# Patient Record
Sex: FEMALE | Race: BLACK OR AFRICAN AMERICAN | Employment: FULL TIME | URBAN - NONMETROPOLITAN AREA
[De-identification: names, ages, dates, MRNs, and addresses within clinical notes are randomized per-mention and may not be internally consistent; named-entity substitution may affect disease eponyms.]

---

## 2018-02-15 ENCOUNTER — HOSPITAL ENCOUNTER (OUTPATIENT)
Age: 33
Setting detail: SPECIMEN
Discharge: HOME | End: 2018-02-15
Payer: COMMERCIAL

## 2018-02-15 PROCEDURE — 88305 TISSUE EXAM BY PATHOLOGIST: CPT

## 2019-09-06 ENCOUNTER — HOSPITAL ENCOUNTER (EMERGENCY)
Age: 34
Discharge: HOME | End: 2019-09-06
Payer: COMMERCIAL

## 2019-09-06 VITALS
WEIGHT: 230 LBS | RESPIRATION RATE: 16 BRPM | DIASTOLIC BLOOD PRESSURE: 82 MMHG | HEART RATE: 74 BPM | SYSTOLIC BLOOD PRESSURE: 140 MMHG | OXYGEN SATURATION: 98 % | HEIGHT: 66 IN | BODY MASS INDEX: 36.96 KG/M2 | TEMPERATURE: 98.1 F

## 2019-09-06 DIAGNOSIS — J03.90 ACUTE TONSILLITIS, UNSPECIFIED ETIOLOGY: Primary | ICD-10-CM

## 2019-09-06 DIAGNOSIS — J02.9 ACUTE PHARYNGITIS, UNSPECIFIED ETIOLOGY: ICD-10-CM

## 2019-09-06 LAB
GROUP A STREP CULTURE, REFLEX: NEGATIVE
REFLEX THROAT C + S: NORMAL

## 2019-09-06 PROCEDURE — 99213 OFFICE O/P EST LOW 20 MIN: CPT | Performed by: NURSE PRACTITIONER

## 2019-09-06 PROCEDURE — 99213 OFFICE O/P EST LOW 20 MIN: CPT

## 2019-09-06 PROCEDURE — 87070 CULTURE OTHR SPECIMN AEROBIC: CPT

## 2019-09-06 RX ORDER — AMOXICILLIN 500 MG/1
500 CAPSULE ORAL 3 TIMES DAILY
Qty: 30 CAPSULE | Refills: 0 | Status: SHIPPED | OUTPATIENT
Start: 2019-09-09 | End: 2019-09-19

## 2019-09-06 SDOH — HEALTH STABILITY: MENTAL HEALTH: HOW OFTEN DO YOU HAVE A DRINK CONTAINING ALCOHOL?: NEVER

## 2019-09-06 ASSESSMENT — ENCOUNTER SYMPTOMS
SORE THROAT: 1
SINUS PRESSURE: 1
SINUS PAIN: 1
RHINORRHEA: 1

## 2019-09-06 NOTE — ED PROVIDER NOTES
Height as of this encounter: 5' 6\" (1.676 m). Weight as of this encounter: 230 lb (104.3 kg). ,Patient's last menstrual period was 08/12/2019. Physical Exam   Constitutional: She is oriented to person, place, and time. She appears well-developed and well-nourished. No distress. HENT:   Right Ear: Hearing, tympanic membrane, external ear and ear canal normal. Tympanic membrane is not injected, not scarred, not perforated, not erythematous, not retracted and not bulging. Tympanic membrane mobility is normal.   Left Ear: Tympanic membrane, external ear and ear canal normal. Tympanic membrane is not injected, not scarred, not perforated, not erythematous, not retracted and not bulging. Tympanic membrane mobility is normal.   Mouth/Throat: Oropharynx is clear and moist and mucous membranes are normal. Mucous membranes are not pale, not dry and not cyanotic. No oropharyngeal exudate, posterior oropharyngeal edema, posterior oropharyngeal erythema or tonsillar abscesses. Neck: Normal range of motion. Neck supple. Musculoskeletal: Normal range of motion. She exhibits no edema, tenderness or deformity. Neurological: She is alert and oriented to person, place, and time. No sensory deficit. Skin: Skin is warm and dry. Capillary refill takes less than 2 seconds. No rash noted. She is not diaphoretic. No erythema. Psychiatric: She has a normal mood and affect.  Her behavior is normal. Judgment and thought content normal.       DIAGNOSTIC RESULTS     Labs:  Results for orders placed or performed during the hospital encounter of 09/06/19   Strep A culture, throat   Result Value Ref Range    REFLEX THROAT C + S INDICATED    STREP A ANTIGEN   Result Value Ref Range    GROUP A STREP CULTURE, REFLEX NEGATIVE        IMAGING:    No orders to display     URGENT CARE COURSE:     Vitals:    09/06/19 1654   BP: (!) 140/82   Pulse: 74   Resp: 16   Temp: 98.1 °F (36.7 °C)   TempSrc: Temporal   SpO2: 98%   Weight: 230 lb (104.3 kg)   Height: 5' 6\" (1.676 m)       Medications - No data to display         PROCEDURES:  None    FINAL IMPRESSION      1. Acute tonsillitis, unspecified etiology    2. Acute pharyngitis, unspecified etiology          DISPOSITION/ PLAN   Patient is discharged home for amoxicillin, she may begin taking in the next 2 to 4 days if symptoms do not seem to be improving. Patient is made aware that her strep swab was negative, however will be sent for culture which can take up to 3 days. There is strong suspicion for viral infection given that patient's symptoms have been present for only 2 to 3 days, she is advised that most upper respiratory infections can take up to a full week to resolve.         PATIENT REFERRED TO:  LISSET Hanson - SHE  [facility] [address] [facility] [address] [facility] [address]      DISCHARGE MEDICATIONS:  Discharge Medication List as of 9/6/2019  5:22 PM      START taking these medications    Details   amoxicillin (AMOXIL) 500 MG capsule Take 1 capsule by mouth 3 times daily for 10 days, Disp-30 capsule, R-0Print             Discharge Medication List as of 9/6/2019  5:22 PM          Discharge Medication List as of 9/6/2019  5:22 PM          LISSET Thomas NP    (Please note that portions of this note were completed with a voice recognition program. Efforts were made to edit the dictations but occasionally words are mis-transcribed.)          LISSET Herrera NP  09/06/19 2862

## 2019-09-08 LAB — THROAT/NOSE CULTURE: NORMAL

## 2019-10-17 ENCOUNTER — HOSPITAL ENCOUNTER (EMERGENCY)
Age: 34
Discharge: HOME | End: 2019-10-17
Payer: COMMERCIAL

## 2019-10-17 VITALS
WEIGHT: 223 LBS | HEIGHT: 66 IN | SYSTOLIC BLOOD PRESSURE: 131 MMHG | HEART RATE: 90 BPM | OXYGEN SATURATION: 96 % | TEMPERATURE: 97 F | RESPIRATION RATE: 16 BRPM | DIASTOLIC BLOOD PRESSURE: 73 MMHG | BODY MASS INDEX: 35.84 KG/M2

## 2019-10-17 DIAGNOSIS — A08.4 VIRAL GASTROENTERITIS: Primary | ICD-10-CM

## 2019-10-17 DIAGNOSIS — G43.009 MIGRAINE WITHOUT AURA AND WITHOUT STATUS MIGRAINOSUS, NOT INTRACTABLE: ICD-10-CM

## 2019-10-17 PROCEDURE — 99213 OFFICE O/P EST LOW 20 MIN: CPT

## 2019-10-17 PROCEDURE — 99213 OFFICE O/P EST LOW 20 MIN: CPT | Performed by: NURSE PRACTITIONER

## 2019-10-17 RX ORDER — ONDANSETRON 4 MG/1
4 TABLET, ORALLY DISINTEGRATING ORAL EVERY 8 HOURS PRN
Qty: 12 TABLET | Refills: 0 | Status: SHIPPED | OUTPATIENT
Start: 2019-10-17 | End: 2019-11-07

## 2019-10-17 ASSESSMENT — ENCOUNTER SYMPTOMS
TROUBLE SWALLOWING: 0
DIARRHEA: 1
ABDOMINAL PAIN: 0
VOMITING: 0
PHOTOPHOBIA: 1
COUGH: 0
RHINORRHEA: 0
SORE THROAT: 0
ABDOMINAL DISTENTION: 0
SHORTNESS OF BREATH: 0
NAUSEA: 1

## 2019-11-07 ENCOUNTER — HOSPITAL ENCOUNTER (EMERGENCY)
Age: 34
Discharge: HOME | End: 2019-11-07
Payer: COMMERCIAL

## 2019-11-07 VITALS
TEMPERATURE: 97.7 F | WEIGHT: 223 LBS | BODY MASS INDEX: 35.99 KG/M2 | OXYGEN SATURATION: 97 % | RESPIRATION RATE: 16 BRPM | DIASTOLIC BLOOD PRESSURE: 70 MMHG | HEART RATE: 71 BPM | SYSTOLIC BLOOD PRESSURE: 119 MMHG

## 2019-11-07 DIAGNOSIS — J06.9 UPPER RESPIRATORY TRACT INFECTION, UNSPECIFIED TYPE: Primary | ICD-10-CM

## 2019-11-07 PROCEDURE — 99213 OFFICE O/P EST LOW 20 MIN: CPT | Performed by: NURSE PRACTITIONER

## 2019-11-07 PROCEDURE — 99212 OFFICE O/P EST SF 10 MIN: CPT

## 2019-11-07 RX ORDER — AMOXICILLIN AND CLAVULANATE POTASSIUM 875; 125 MG/1; MG/1
1 TABLET, FILM COATED ORAL 2 TIMES DAILY WITH MEALS
Qty: 20 TABLET | Refills: 0 | Status: SHIPPED | OUTPATIENT
Start: 2019-11-07 | End: 2019-11-17

## 2019-11-07 RX ORDER — GUAIFENESIN AND DEXTROMETHORPHAN HYDROBROMIDE 600; 30 MG/1; MG/1
1 TABLET, EXTENDED RELEASE ORAL 3 TIMES DAILY PRN
Qty: 30 TABLET | Refills: 0 | Status: SHIPPED | OUTPATIENT
Start: 2019-11-07 | End: 2019-11-17

## 2019-11-07 ASSESSMENT — ENCOUNTER SYMPTOMS
SORE THROAT: 1
NAUSEA: 0
DIARRHEA: 0
ABDOMINAL PAIN: 0
CHEST TIGHTNESS: 0
VOMITING: 0
SINUS PRESSURE: 1
COUGH: 1

## 2019-11-09 ENCOUNTER — HOSPITAL ENCOUNTER (EMERGENCY)
Age: 34
Discharge: HOME | End: 2019-11-09
Payer: COMMERCIAL

## 2019-11-09 VITALS
RESPIRATION RATE: 18 BRPM | OXYGEN SATURATION: 97 % | BODY MASS INDEX: 35.84 KG/M2 | HEIGHT: 66 IN | HEART RATE: 90 BPM | WEIGHT: 223 LBS | TEMPERATURE: 97.6 F | DIASTOLIC BLOOD PRESSURE: 77 MMHG | SYSTOLIC BLOOD PRESSURE: 138 MMHG

## 2019-11-09 DIAGNOSIS — J06.9 ACUTE UPPER RESPIRATORY INFECTION: Primary | ICD-10-CM

## 2019-11-09 PROCEDURE — 99214 OFFICE O/P EST MOD 30 MIN: CPT | Performed by: NURSE PRACTITIONER

## 2019-11-09 PROCEDURE — 99214 OFFICE O/P EST MOD 30 MIN: CPT

## 2019-11-09 RX ORDER — PREDNISONE 20 MG/1
20 TABLET ORAL 2 TIMES DAILY
Qty: 10 TABLET | Refills: 0 | Status: SHIPPED | OUTPATIENT
Start: 2019-11-09 | End: 2019-11-14

## 2019-11-09 RX ORDER — FLUTICASONE PROPIONATE 50 MCG
1 SPRAY, SUSPENSION (ML) NASAL DAILY
Qty: 1 BOTTLE | Refills: 0 | Status: SHIPPED | OUTPATIENT
Start: 2019-11-09 | End: 2019-12-27

## 2019-11-09 ASSESSMENT — ENCOUNTER SYMPTOMS
DIARRHEA: 1
SHORTNESS OF BREATH: 0
VOMITING: 0
SINUS PRESSURE: 1
EYE ITCHING: 0
COUGH: 1
RHINORRHEA: 1
NAUSEA: 0
EYE DISCHARGE: 0
SORE THROAT: 1
SINUS PAIN: 0

## 2019-12-27 ENCOUNTER — HOSPITAL ENCOUNTER (EMERGENCY)
Age: 34
Discharge: HOME | End: 2019-12-27
Payer: COMMERCIAL

## 2019-12-27 VITALS
WEIGHT: 230 LBS | RESPIRATION RATE: 16 BRPM | HEIGHT: 66 IN | SYSTOLIC BLOOD PRESSURE: 101 MMHG | BODY MASS INDEX: 36.96 KG/M2 | DIASTOLIC BLOOD PRESSURE: 69 MMHG | TEMPERATURE: 98.6 F | HEART RATE: 77 BPM | OXYGEN SATURATION: 96 %

## 2019-12-27 DIAGNOSIS — J06.9 VIRAL URI WITH COUGH: ICD-10-CM

## 2019-12-27 DIAGNOSIS — H65.01 NON-RECURRENT ACUTE SEROUS OTITIS MEDIA OF RIGHT EAR: Primary | ICD-10-CM

## 2019-12-27 PROCEDURE — 99213 OFFICE O/P EST LOW 20 MIN: CPT | Performed by: NURSE PRACTITIONER

## 2019-12-27 PROCEDURE — 99212 OFFICE O/P EST SF 10 MIN: CPT

## 2019-12-27 RX ORDER — HYDROXYZINE PAMOATE 50 MG/1
50 CAPSULE ORAL 3 TIMES DAILY PRN
COMMUNITY
End: 2021-09-01

## 2019-12-27 RX ORDER — TOPIRAMATE 50 MG/1
50 TABLET, FILM COATED ORAL 2 TIMES DAILY
COMMUNITY

## 2019-12-27 RX ORDER — VENLAFAXINE HYDROCHLORIDE 150 MG/1
150 CAPSULE, EXTENDED RELEASE ORAL DAILY
COMMUNITY

## 2019-12-27 RX ORDER — AMITRIPTYLINE HYDROCHLORIDE 25 MG/1
25 TABLET, FILM COATED ORAL NIGHTLY
COMMUNITY

## 2019-12-27 RX ORDER — AMOXICILLIN 875 MG/1
875 TABLET, COATED ORAL 2 TIMES DAILY
Qty: 20 TABLET | Refills: 0 | Status: SHIPPED | OUTPATIENT
Start: 2019-12-27 | End: 2020-01-06

## 2019-12-27 ASSESSMENT — ENCOUNTER SYMPTOMS
NAUSEA: 0
BACK PAIN: 0
COUGH: 1
VOMITING: 0
DIARRHEA: 0
RHINORRHEA: 0
SHORTNESS OF BREATH: 0
TROUBLE SWALLOWING: 0
SINUS PRESSURE: 0
SORE THROAT: 0

## 2019-12-27 ASSESSMENT — PAIN SCALES - GENERAL: PAINLEVEL_OUTOF10: 8

## 2019-12-27 ASSESSMENT — PAIN DESCRIPTION - LOCATION: LOCATION: EAR

## 2019-12-27 ASSESSMENT — PAIN DESCRIPTION - ORIENTATION: ORIENTATION: RIGHT

## 2020-01-04 ENCOUNTER — HOSPITAL ENCOUNTER (EMERGENCY)
Age: 35
Discharge: HOME | End: 2020-01-04
Attending: EMERGENCY MEDICINE
Payer: COMMERCIAL

## 2020-01-04 VITALS
HEIGHT: 66 IN | BODY MASS INDEX: 38.57 KG/M2 | OXYGEN SATURATION: 93 % | TEMPERATURE: 97.4 F | RESPIRATION RATE: 16 BRPM | SYSTOLIC BLOOD PRESSURE: 110 MMHG | HEART RATE: 81 BPM | DIASTOLIC BLOOD PRESSURE: 70 MMHG | WEIGHT: 240 LBS

## 2020-01-04 PROCEDURE — 6370000000 HC RX 637 (ALT 250 FOR IP): Performed by: EMERGENCY MEDICINE

## 2020-01-04 PROCEDURE — 99214 OFFICE O/P EST MOD 30 MIN: CPT | Performed by: EMERGENCY MEDICINE

## 2020-01-04 PROCEDURE — 99213 OFFICE O/P EST LOW 20 MIN: CPT

## 2020-01-04 RX ORDER — ONDANSETRON 4 MG/1
4 TABLET, ORALLY DISINTEGRATING ORAL 4 TIMES DAILY PRN
Qty: 12 TABLET | Refills: 0 | Status: SHIPPED | OUTPATIENT
Start: 2020-01-04 | End: 2020-05-16

## 2020-01-04 RX ORDER — ONDANSETRON 4 MG/1
8 TABLET, ORALLY DISINTEGRATING ORAL ONCE
Status: COMPLETED | OUTPATIENT
Start: 2020-01-04 | End: 2020-01-04

## 2020-01-04 RX ADMIN — ONDANSETRON 8 MG: 4 TABLET, ORALLY DISINTEGRATING ORAL at 09:16

## 2020-01-04 ASSESSMENT — ENCOUNTER SYMPTOMS
COUGH: 0
TROUBLE SWALLOWING: 0
SINUS PRESSURE: 0
VOMITING: 0
DIARRHEA: 1
ABDOMINAL PAIN: 1
BACK PAIN: 0
WHEEZING: 0
BLOOD IN STOOL: 0
SHORTNESS OF BREATH: 0
EYE PAIN: 0
EYE REDNESS: 0
VOICE CHANGE: 0
SORE THROAT: 0
EYE DISCHARGE: 0
CHOKING: 0
CONSTIPATION: 0
FACIAL SWELLING: 0
NAUSEA: 1
STRIDOR: 0

## 2020-01-04 ASSESSMENT — PAIN SCALES - GENERAL: PAINLEVEL_OUTOF10: 8

## 2020-01-04 ASSESSMENT — PAIN DESCRIPTION - PAIN TYPE: TYPE: ACUTE PAIN

## 2020-01-04 ASSESSMENT — PAIN DESCRIPTION - DESCRIPTORS: DESCRIPTORS: ACHING

## 2020-01-04 ASSESSMENT — PAIN DESCRIPTION - LOCATION: LOCATION: ABDOMEN

## 2020-01-04 NOTE — ED PROVIDER NOTES
tenderness. Left Sinus: No maxillary sinus tenderness or frontal sinus tenderness. Mouth/Throat:      Pharynx: No oropharyngeal exudate. Comments: Oropharynx normal.  TMs normal  Eyes:      General: No scleral icterus. Right eye: No discharge. Left eye: No discharge. Conjunctiva/sclera: Conjunctivae normal.      Pupils: Pupils are equal, round, and reactive to light. Comments: Conjunctiva clear   Neck:      Musculoskeletal: Normal range of motion. Thyroid: No thyromegaly. Vascular: No JVD. Comments: No meningismus  Cardiovascular:      Rate and Rhythm: Normal rate and regular rhythm. Pulses: Normal pulses. Heart sounds: Normal heart sounds, S1 normal and S2 normal. No murmur. No friction rub. No gallop. Pulmonary:      Effort: Pulmonary effort is normal. No respiratory distress. Breath sounds: Normal breath sounds. No stridor. No wheezing or rales. Comments: No cough, lungs clear  Chest:      Chest wall: No tenderness. Abdominal:      General: Bowel sounds are normal. There is no distension. Palpations: Abdomen is soft. There is no mass. Tenderness: There is tenderness in the epigastric area and periumbilical area. There is no right CVA tenderness, left CVA tenderness, guarding or rebound. Comments:  bowel sounds present, no right lower quadrant tenderness   Musculoskeletal: Normal range of motion. General: No tenderness. Right lower leg: Normal.      Left lower leg: Normal.      Comments: Normal joints   Lymphadenopathy:      Cervical: Cervical adenopathy present. Right cervical: Superficial cervical adenopathy present. No deep or posterior cervical adenopathy. Left cervical: Superficial cervical adenopathy present. No deep or posterior cervical adenopathy. Skin:     General: Skin is warm and dry. Findings: No erythema or rash.       Comments: No Rash or bruising   Neurological:      Mental

## 2020-01-04 NOTE — LETTER
[address] [facility]  [address] [facility] [address]  Phone: [phone]               January 4, 2020    Patient: Kate Henry   YOB: 1985   Date of Visit: 1/4/2020       To Whom It May Concern:    Shawn Menendez was seen and treated in our emergency department on 1/4/2020. She may return to work on 1/6/20. No work January 4 and January 5, 2020.       Sincerely,       Larry Hwang MD         Signature:__________________________________

## 2020-03-02 ENCOUNTER — HOSPITAL ENCOUNTER (EMERGENCY)
Age: 35
Discharge: HOME | End: 2020-03-02
Payer: COMMERCIAL

## 2020-03-02 VITALS
OXYGEN SATURATION: 97 % | SYSTOLIC BLOOD PRESSURE: 116 MMHG | DIASTOLIC BLOOD PRESSURE: 62 MMHG | HEART RATE: 82 BPM | WEIGHT: 224 LBS | BODY MASS INDEX: 36.15 KG/M2 | RESPIRATION RATE: 16 BRPM | TEMPERATURE: 98.1 F

## 2020-03-02 PROCEDURE — 99214 OFFICE O/P EST MOD 30 MIN: CPT | Performed by: NURSE PRACTITIONER

## 2020-03-02 PROCEDURE — 99212 OFFICE O/P EST SF 10 MIN: CPT

## 2020-03-02 RX ORDER — CEFDINIR 300 MG/1
300 CAPSULE ORAL 2 TIMES DAILY
Qty: 20 CAPSULE | Refills: 0 | Status: SHIPPED | OUTPATIENT
Start: 2020-03-02 | End: 2020-03-12

## 2020-03-02 ASSESSMENT — ENCOUNTER SYMPTOMS
COUGH: 1
SORE THROAT: 0
DIARRHEA: 0
CHEST TIGHTNESS: 0
SINUS PRESSURE: 1
EYE ITCHING: 0
ABDOMINAL PAIN: 0
RHINORRHEA: 0
VOMITING: 0
EYE REDNESS: 0
SHORTNESS OF BREATH: 0
SINUS PAIN: 1
NAUSEA: 0

## 2020-03-02 NOTE — ED PROVIDER NOTES
[address]       Chief Complaint   Patient presents with    Otalgia     Pt has been having pain in both ears. Pt also having trouble with vertigo. Nurses Notes reviewed and I agree except as noted in the HPI. HISTORY OF PRESENT ILLNESS   Judy Ramirez is a 37 y.o. female who presents for evaluation of ear pain and fullness for the last 3 days. No medications taken for symptoms. REVIEW OF SYSTEMS     Review of Systems   Constitutional: Negative for chills and fever. HENT: Positive for congestion, ear pain (some dizziness), sinus pressure and sinus pain. Negative for postnasal drip, rhinorrhea and sore throat. Eyes: Negative for redness and itching. Respiratory: Positive for cough. Negative for chest tightness and shortness of breath. Cardiovascular: Negative for chest pain. Gastrointestinal: Negative for abdominal pain, diarrhea, nausea and vomiting. Musculoskeletal: Negative for joint swelling and myalgias. Skin: Negative for rash. Allergic/Immunologic: Negative for environmental allergies and food allergies. Neurological: Negative for headaches. PAST MEDICAL HISTORY         Diagnosis Date    Anxiety     Depression     Migraines        SURGICAL HISTORY     Patient  has a past surgical history that includes Tubal ligation.     CURRENT MEDICATIONS       Discharge Medication List as of 3/2/2020  3:50 PM      CONTINUE these medications which have NOT CHANGED    Details   ondansetron (ZOFRAN ODT) 4 MG disintegrating tablet Take 1 tablet by mouth 4 times daily as needed for Nausea or Vomiting (Dissolve on tongue 4 times daily for nausea or vomiting), Disp-12 tablet, R-0Print      amitriptyline (ELAVIL) 25 MG tablet Take 25 mg by mouth nightlyHistorical Med      venlafaxine (EFFEXOR XR) 150 MG extended release capsule Take 150 mg by mouth dailyHistorical Med      hydrOXYzine (VISTARIL) 50 MG capsule Take 50 mg by mouth 3 times daily as needed for ItchingHistorical Med      Pantoprazole Sodium (PROTONIX PO) Take by mouthHistorical Med      topiramate (TOPAMAX) 50 MG tablet Take 50 mg by mouth 2 times dailyHistorical Med             ALLERGIES     Patient is is allergic to latex. FAMILY HISTORY     Patient'sfamily history includes Diabetes in her mother; High Blood Pressure in her father and mother; Other in her mother. SOCIAL HISTORY     Patient  reports that she has never smoked. She has never used smokeless tobacco. She reports that she does not drink alcohol or use drugs. PHYSICAL EXAM     ED TRIAGE VITALS  BP: 116/62, Temp: 98.1 °F (36.7 °C), Pulse: 82, Resp: 16, SpO2: 97 %  Physical Exam  Vitals signs and nursing note reviewed. Constitutional:       General: She is not in acute distress. Appearance: Normal appearance. She is well-developed. HENT:      Head: Normocephalic and atraumatic. Right Ear: Ear canal and external ear normal. No middle ear effusion. Left Ear: Ear canal and external ear normal. A middle ear effusion (cloudy) is present. Nose: Nose normal.      Mouth/Throat:      Lips: Pink. Mouth: Mucous membranes are moist.      Pharynx: Oropharynx is clear. Eyes:      Conjunctiva/sclera: Conjunctivae normal.      Right eye: Right conjunctiva is not injected. Left eye: Left conjunctiva is not injected. Pupils: Pupils are equal.   Neck:      Musculoskeletal: Normal range of motion. Cardiovascular:      Rate and Rhythm: Normal rate. Heart sounds: Normal heart sounds. Pulmonary:      Effort: Pulmonary effort is normal.      Breath sounds: Normal breath sounds. Lymphadenopathy:      Cervical: No cervical adenopathy. Skin:     General: Skin is warm and dry. Findings: No rash (on exposed surfaces). Neurological:      Mental Status: She is alert and oriented to person, place, and time.    Psychiatric:         Speech: Speech normal.         Behavior:

## 2020-03-15 ENCOUNTER — HOSPITAL ENCOUNTER (EMERGENCY)
Age: 35
Discharge: HOME | End: 2020-03-15
Payer: COMMERCIAL

## 2020-03-15 VITALS
RESPIRATION RATE: 16 BRPM | BODY MASS INDEX: 36.15 KG/M2 | WEIGHT: 224 LBS | SYSTOLIC BLOOD PRESSURE: 126 MMHG | DIASTOLIC BLOOD PRESSURE: 62 MMHG | HEART RATE: 76 BPM | OXYGEN SATURATION: 98 % | TEMPERATURE: 98.6 F

## 2020-03-15 PROCEDURE — 99212 OFFICE O/P EST SF 10 MIN: CPT

## 2020-03-15 PROCEDURE — 99213 OFFICE O/P EST LOW 20 MIN: CPT | Performed by: NURSE PRACTITIONER

## 2020-03-15 RX ORDER — AMOXICILLIN 500 MG/1
500 CAPSULE ORAL 2 TIMES DAILY
Qty: 20 CAPSULE | Refills: 0 | Status: SHIPPED | OUTPATIENT
Start: 2020-03-15 | End: 2020-03-25

## 2020-03-15 ASSESSMENT — ENCOUNTER SYMPTOMS
COUGH: 1
EYE PAIN: 0
EYE REDNESS: 0
EYE ITCHING: 0
SINUS PRESSURE: 0
SHORTNESS OF BREATH: 0
NAUSEA: 0
CONSTIPATION: 0
CHEST TIGHTNESS: 0
DIARRHEA: 0
BACK PAIN: 0
EYE DISCHARGE: 0
WHEEZING: 0
VOMITING: 0
RHINORRHEA: 1
TROUBLE SWALLOWING: 0
SORE THROAT: 1
ABDOMINAL PAIN: 0

## 2020-03-15 NOTE — ED PROVIDER NOTES
[address]       Chief Complaint   Patient presents with    Otalgia     r ear       Nurses Notes reviewed and I agree except as noted in the HPI. HISTORY OF PRESENT ILLNESS   Nicole Reyes is a 37 y.o. female who presents urgent care for complaints of right otalgia. The history is provided by the patient. Ear Problem   Location:  Right  Quality:  Aching and pressure  Onset quality:  Sudden  Timing:  Constant  Progression:  Unchanged  Chronicity:  New  Context: recent URI    Relieved by:  Nothing  Worsened by:  Coughing and swallowing  Ineffective treatments:  None tried  Associated symptoms: congestion, cough, headaches, rhinorrhea and sore throat    Associated symptoms: no abdominal pain, no diarrhea, no ear discharge, no fever, no hearing loss, no tinnitus and no vomiting        REVIEW OF SYSTEMS     Review of Systems   Constitutional: Negative for activity change, appetite change, chills, fatigue and fever. HENT: Positive for congestion, postnasal drip, rhinorrhea and sore throat. Negative for ear discharge, ear pain, hearing loss, sinus pressure, tinnitus and trouble swallowing. Eyes: Negative for pain, discharge, redness and itching. Respiratory: Positive for cough. Negative for chest tightness, shortness of breath and wheezing. Cardiovascular: Negative for chest pain, palpitations and leg swelling. Gastrointestinal: Negative for abdominal pain, constipation, diarrhea, nausea and vomiting. Endocrine: Negative. Genitourinary: Negative for dysuria, flank pain, frequency and hematuria. Musculoskeletal: Negative for arthralgias, back pain, joint swelling and myalgias. Skin: Negative. Neurological: Positive for headaches. Negative for dizziness and light-headedness. Hematological: Negative.         PAST MEDICAL HISTORY         Diagnosis Date    Anxiety     Depression     Migraines        SURGICAL HISTORY range of motion and neck supple. Thyroid: No thyromegaly. Vascular: No JVD. Trachea: No tracheal deviation. Cardiovascular:      Rate and Rhythm: Normal rate and regular rhythm. Heart sounds: Normal heart sounds. No murmur. No friction rub. No gallop. Pulmonary:      Effort: Pulmonary effort is normal. No respiratory distress. Breath sounds: Normal breath sounds. No stridor. No wheezing or rales. Chest:      Chest wall: No tenderness. Lymphadenopathy:      Cervical: No cervical adenopathy. Skin:     General: Skin is warm and dry. Neurological:      Mental Status: She is alert and oriented to person, place, and time. Psychiatric:         Behavior: Behavior normal.         Judgment: Judgment normal.         DIAGNOSTIC RESULTS   Labs: No results found for this visit on 03/15/20. IMAGING:  No orders to display     URGENT CARE COURSE:     Vitals:    03/15/20 1236 03/15/20 1335   BP: (!) 125/56 126/62   Pulse: 78 76   Resp: 16 16   Temp: 98.6 °F (37 °C)    TempSrc: Temporal    SpO2: 98% 98%   Weight: 224 lb (101.6 kg)        Medications - No data to display  PROCEDURES:  None  FINALIMPRESSION      1. Non-recurrent acute serous otitis media of right ear        DISPOSITION/PLAN   DISPOSITION Decision To Discharge 03/15/2020 01:31:10 PM  Patient given educational materials - see patient instructions. Discussed use, benefit, and side effects of prescribed medications. All patient questions answered. Pt voiced understanding. Reviewed health maintenance. Patient agreed with treatment plan.  Follow up as directed.        PATIENT REFERRED TO:  LISSET Pope - CNP  [address] [place] [address]  [phone]    In 2 days  If symptoms worsen    DISCHARGE MEDICATIONS:  Discharge Medication List as of 3/15/2020  1:32 PM      START taking these medications    Details   amoxicillin (AMOXIL) 500 MG capsule Take 1 capsule by mouth 2 times daily for 10 days, Disp-20

## 2020-03-29 ENCOUNTER — HOSPITAL ENCOUNTER (EMERGENCY)
Age: 35
Discharge: HOME | End: 2020-03-29
Payer: COMMERCIAL

## 2020-03-29 VITALS
RESPIRATION RATE: 18 BRPM | HEART RATE: 96 BPM | OXYGEN SATURATION: 96 % | DIASTOLIC BLOOD PRESSURE: 70 MMHG | SYSTOLIC BLOOD PRESSURE: 116 MMHG | TEMPERATURE: 98.4 F

## 2020-03-29 PROCEDURE — 99213 OFFICE O/P EST LOW 20 MIN: CPT | Performed by: NURSE PRACTITIONER

## 2020-03-29 PROCEDURE — 99212 OFFICE O/P EST SF 10 MIN: CPT

## 2020-03-29 RX ORDER — OFLOXACIN 3 MG/ML
10 SOLUTION AURICULAR (OTIC) DAILY
Qty: 1 BOTTLE | Refills: 0 | Status: SHIPPED | OUTPATIENT
Start: 2020-03-29 | End: 2020-04-05

## 2020-03-29 ASSESSMENT — ENCOUNTER SYMPTOMS
DIARRHEA: 0
SINUS PAIN: 0
TROUBLE SWALLOWING: 0
COUGH: 0
FACIAL SWELLING: 0
SORE THROAT: 0
EYE DISCHARGE: 0
EYE REDNESS: 0
SINUS PRESSURE: 0
NAUSEA: 0
VOMITING: 0
SHORTNESS OF BREATH: 0
RHINORRHEA: 1

## 2020-03-29 ASSESSMENT — PAIN DESCRIPTION - ORIENTATION: ORIENTATION: RIGHT

## 2020-03-29 ASSESSMENT — PAIN SCALES - GENERAL: PAINLEVEL_OUTOF10: 8

## 2020-03-29 ASSESSMENT — PAIN DESCRIPTION - FREQUENCY: FREQUENCY: CONTINUOUS

## 2020-03-29 ASSESSMENT — PAIN DESCRIPTION - PAIN TYPE: TYPE: ACUTE PAIN

## 2020-03-29 ASSESSMENT — PAIN DESCRIPTION - DESCRIPTORS: DESCRIPTORS: CONSTANT

## 2020-03-29 ASSESSMENT — PAIN DESCRIPTION - LOCATION: LOCATION: EAR

## 2020-03-29 NOTE — ED PROVIDER NOTES
[address]       Chief Complaint   Patient presents with    Otalgia     right ear pain onset 1.5 months ago       Nurses Notes reviewed and I agree except as noted in the HPI. HISTORY OF PRESENT ILLNESS   Latoya Negron is a 37 y.o. female who presents with complaints of right ear pain. Onset of symptoms between 1 and 2 months ago, unchanged. Pain is intermittent. Rates 8/10. No fever. No otorrhea. She has also had intermittent nasal congestion. Patient cleans her ears frequently due to work. She is also completed 2 rounds of antibiotics recently. No improvement with prescription/over-the-counter treatments. REVIEW OF SYSTEMS     Review of Systems   Constitutional: Negative for chills, diaphoresis, fatigue and fever. HENT: Positive for congestion, ear pain, postnasal drip and rhinorrhea. Negative for ear discharge, facial swelling, hearing loss, sinus pressure, sinus pain, sore throat and trouble swallowing. Eyes: Negative for discharge and redness. Respiratory: Negative for cough and shortness of breath. Cardiovascular: Negative for chest pain. Gastrointestinal: Negative for diarrhea, nausea and vomiting. Musculoskeletal: Negative for neck pain and neck stiffness. Skin: Negative for rash. Neurological: Negative for dizziness, light-headedness and headaches. Hematological: Negative for adenopathy. Psychiatric/Behavioral: Negative for sleep disturbance. PAST MEDICAL HISTORY         Diagnosis Date    Anxiety     Depression     Migraines        SURGICAL HISTORY     Patient  has a past surgical history that includes Tubal ligation and Tear duct surgery.     CURRENT MEDICATIONS       Discharge Medication List as of 3/29/2020 10:42 AM      CONTINUE these medications which have NOT CHANGED    Details   ondansetron (ZOFRAN ODT) 4 MG disintegrating tablet Take 1 tablet by mouth 4 times daily as needed for Nausea display  PROCEDURES:  None  FINALIMPRESSION      1. Allergic rhinitis, unspecified seasonality, unspecified trigger    2. Otalgia of right ear    3. Acute DEJON (middle ear effusion), bilateral    4. Dysfunction of both eustachian tubes        DISPOSITION/PLAN   DISPOSITION Decision To Discharge 03/29/2020 10:40:02 AM    Toxic, no acute distress. Exam consistent with allergic rhinitis. Switch to Claritin-D versus Claritin. Add Flonase daily. Sinus rinse twice daily. Patient also complains of right tragal tenderness, medication as prescribed. Follow-up with PCP as needed. If symptoms worsen return or go to ER. PATIENT REFERRED TO:  LISSET Harding CNP  [facility]  [address]  [phone]    In 1 week  Follow up as needed. Medication as prescribed. Resume Flonase. Switch to Claritin-D. Sinus rinse 2x/day. If worse return or go to ER.     DISCHARGE MEDICATIONS:  Discharge Medication List as of 3/29/2020 10:42 AM      START taking these medications    Details   ofloxacin (FLOXIN OTIC) 0.3 % otic solution Place 10 drops into the right ear daily for 7 days, Disp-1 Bottle, R-0Normal           Discharge Medication List as of 3/29/2020 10:42 AM          LISSET Cnonolly CNP, APRN - CNP  03/29/20 2078

## 2020-05-16 ENCOUNTER — HOSPITAL ENCOUNTER (EMERGENCY)
Age: 35
Discharge: HOME | End: 2020-05-16
Payer: COMMERCIAL

## 2020-05-16 VITALS
SYSTOLIC BLOOD PRESSURE: 119 MMHG | TEMPERATURE: 98.9 F | WEIGHT: 228 LBS | HEART RATE: 89 BPM | DIASTOLIC BLOOD PRESSURE: 71 MMHG | OXYGEN SATURATION: 98 % | HEIGHT: 66 IN | RESPIRATION RATE: 16 BRPM | BODY MASS INDEX: 36.64 KG/M2

## 2020-05-16 PROCEDURE — 99212 OFFICE O/P EST SF 10 MIN: CPT

## 2020-05-16 PROCEDURE — 99213 OFFICE O/P EST LOW 20 MIN: CPT | Performed by: NURSE PRACTITIONER

## 2020-05-16 RX ORDER — ONDANSETRON 4 MG/1
4 TABLET, ORALLY DISINTEGRATING ORAL EVERY 8 HOURS PRN
Qty: 15 TABLET | Refills: 0 | Status: SHIPPED | OUTPATIENT
Start: 2020-05-16 | End: 2020-05-21

## 2020-05-16 RX ORDER — DICYCLOMINE HCL 20 MG
20 TABLET ORAL 4 TIMES DAILY PRN
Qty: 20 TABLET | Refills: 0 | Status: SHIPPED | OUTPATIENT
Start: 2020-05-16 | End: 2021-09-01

## 2020-05-16 ASSESSMENT — ENCOUNTER SYMPTOMS
COUGH: 0
ABDOMINAL DISTENTION: 0
BLOOD IN STOOL: 0
ABDOMINAL PAIN: 1
NAUSEA: 1
VOMITING: 0
DIARRHEA: 1
SHORTNESS OF BREATH: 0

## 2020-05-16 ASSESSMENT — PAIN DESCRIPTION - DESCRIPTORS: DESCRIPTORS: CRAMPING

## 2020-05-16 ASSESSMENT — PAIN SCALES - GENERAL: PAINLEVEL_OUTOF10: 10

## 2020-05-16 ASSESSMENT — PAIN DESCRIPTION - LOCATION: LOCATION: ABDOMEN

## 2020-05-16 NOTE — ED PROVIDER NOTES
[facility]  Urgent Care Encounter       CHIEF COMPLAINT       Chief Complaint   Patient presents with    Nausea    Diarrhea    Dizziness       Nurses Notes reviewed and I agree except as noted in the HPI. HISTORY OF PRESENT ILLNESS   Vera Lujan is a 37 y.o. female who presents with complaints of nausea, vomiting and diarrhea. Symptoms started 3 days ago with diarrhea. She had frequent diarrhea up until today when it has resolved. She has had multiple bowel movements today however they have been formed. She is also reporting fatigue, abdominal cramping as well as occasional dizziness and headaches. She reports a subjective fever with with hot sensation but has not checked a temperature. She denies any respiratory symptoms. She has been able to eat however has a decreased appetite and has been drinking water and Gatorade. The history is provided by the patient. REVIEW OF SYSTEMS     Review of Systems   Constitutional: Positive for appetite change, fatigue and fever (Subjective). Negative for chills. HENT: Negative. Respiratory: Negative for cough and shortness of breath. Cardiovascular: Negative for chest pain. Gastrointestinal: Positive for abdominal pain, diarrhea and nausea. Negative for abdominal distention, blood in stool and vomiting. Genitourinary: Negative for decreased urine volume. Musculoskeletal: Negative for myalgias. Neurological: Positive for dizziness and headaches. PAST MEDICAL HISTORY         Diagnosis Date    Anxiety     Depression     Migraines        SURGICALHISTORY     Patient  has a past surgical history that includes Tubal ligation and Tear duct surgery.     CURRENT MEDICATIONS       Discharge Medication List as of 5/16/2020  6:33 PM      CONTINUE these medications which have NOT CHANGED    Details   amitriptyline (ELAVIL) 25 MG tablet Take 25 mg by mouth nightlyHistorical Med      venlafaxine (EFFEXOR XR) 150 MG extended release capsule Take 150 mg by mouth dailyHistorical Med      hydrOXYzine (VISTARIL) 50 MG capsule Take 50 mg by mouth 3 times daily as needed for ItchingHistorical Med      Pantoprazole Sodium (PROTONIX PO) Take by mouthHistorical Med      topiramate (TOPAMAX) 50 MG tablet Take 50 mg by mouth 2 times dailyHistorical Med             ALLERGIES     Patient is is allergic to latex. Patients   There is no immunization history on file for this patient. FAMILY HISTORY     Patient's family history includes Diabetes in her mother; High Blood Pressure in her father and mother; Other in her mother. SOCIAL HISTORY     Patient  reports that she has quit smoking. She smoked 0.50 packs per day. She has never used smokeless tobacco. She reports previous alcohol use. She reports that she does not use drugs. PHYSICAL EXAM     ED TRIAGE VITALS  BP: 119/71, Temp: 98.9 °F (37.2 °C), Pulse: 89, Resp: 16, SpO2: 98 %,Estimated body mass index is 36.8 kg/m² as calculated from the following:    Height as of this encounter: 5' 6\" (1.676 m). Weight as of this encounter: 228 lb (103.4 kg). ,Patient's last menstrual period was 05/02/2020. Physical Exam  Vitals signs and nursing note reviewed. Constitutional:       General: She is not in acute distress. Appearance: She is well-developed. She is not ill-appearing. HENT:      Head: Normocephalic and atraumatic. Eyes:      Conjunctiva/sclera: Conjunctivae normal.      Pupils: Pupils are equal.   Cardiovascular:      Rate and Rhythm: Normal rate and regular rhythm. Heart sounds: Normal heart sounds, S1 normal and S2 normal.   Pulmonary:      Effort: Pulmonary effort is normal.      Breath sounds: Normal breath sounds and air entry. Abdominal:      General: Bowel sounds are normal. There is no distension. Palpations: Abdomen is soft. There is no mass. Tenderness: There is generalized abdominal tenderness. There is no guarding or rebound.    Skin:

## 2020-05-18 ENCOUNTER — CARE COORDINATION (OUTPATIENT)
Dept: CARE COORDINATION | Age: 35
End: 2020-05-18

## 2020-09-11 ENCOUNTER — HOSPITAL ENCOUNTER (OUTPATIENT)
Dept: MRI IMAGING | Age: 35
Discharge: HOME | End: 2020-09-11
Payer: COMMERCIAL

## 2020-09-11 PROCEDURE — 73721 MRI JNT OF LWR EXTRE W/O DYE: CPT

## 2020-10-08 ENCOUNTER — HOSPITAL ENCOUNTER (EMERGENCY)
Age: 35
Discharge: HOME | End: 2020-10-08
Payer: COMMERCIAL

## 2020-10-08 VITALS
SYSTOLIC BLOOD PRESSURE: 111 MMHG | WEIGHT: 224 LBS | BODY MASS INDEX: 36.15 KG/M2 | DIASTOLIC BLOOD PRESSURE: 65 MMHG | RESPIRATION RATE: 16 BRPM | TEMPERATURE: 98.5 F | HEART RATE: 86 BPM | OXYGEN SATURATION: 97 %

## 2020-10-08 PROCEDURE — 99212 OFFICE O/P EST SF 10 MIN: CPT

## 2020-10-08 PROCEDURE — 99213 OFFICE O/P EST LOW 20 MIN: CPT | Performed by: NURSE PRACTITIONER

## 2020-10-08 RX ORDER — AZITHROMYCIN 250 MG/1
TABLET, FILM COATED ORAL
Qty: 1 PACKET | Refills: 0 | Status: SHIPPED | OUTPATIENT
Start: 2020-10-08 | End: 2020-10-12

## 2020-10-08 RX ORDER — GUAIFENESIN AND PSEUDOEPHEDRINE HCL 1200; 120 MG/1; MG/1
1 TABLET, EXTENDED RELEASE ORAL 2 TIMES DAILY PRN
Qty: 20 TABLET | Refills: 0 | Status: SHIPPED | OUTPATIENT
Start: 2020-10-08 | End: 2022-01-26 | Stop reason: SDUPTHER

## 2020-10-08 RX ORDER — FLUTICASONE PROPIONATE 50 MCG
1 SPRAY, SUSPENSION (ML) NASAL DAILY
Qty: 1 BOTTLE | Refills: 0 | Status: SHIPPED | OUTPATIENT
Start: 2020-10-08 | End: 2021-02-03 | Stop reason: SDUPTHER

## 2020-10-08 ASSESSMENT — ENCOUNTER SYMPTOMS
DIARRHEA: 0
TROUBLE SWALLOWING: 0
SHORTNESS OF BREATH: 1
SORE THROAT: 1
COUGH: 1
ABDOMINAL PAIN: 0
CHEST TIGHTNESS: 0
NAUSEA: 0
VOMITING: 0
SINUS PRESSURE: 1
RHINORRHEA: 1

## 2020-10-08 ASSESSMENT — PAIN DESCRIPTION - LOCATION: LOCATION: FACE

## 2020-10-08 ASSESSMENT — PAIN SCALES - GENERAL: PAINLEVEL_OUTOF10: 10

## 2020-10-08 ASSESSMENT — PAIN DESCRIPTION - FREQUENCY: FREQUENCY: CONTINUOUS

## 2020-10-08 ASSESSMENT — PAIN DESCRIPTION - DESCRIPTORS: DESCRIPTORS: ACHING;PRESSURE

## 2020-10-08 ASSESSMENT — PAIN DESCRIPTION - ORIENTATION: ORIENTATION: LEFT;RIGHT;UPPER

## 2020-10-08 ASSESSMENT — PAIN DESCRIPTION - PAIN TYPE: TYPE: ACUTE PAIN

## 2020-10-08 ASSESSMENT — PAIN - FUNCTIONAL ASSESSMENT: PAIN_FUNCTIONAL_ASSESSMENT: PREVENTS OR INTERFERES SOME ACTIVE ACTIVITIES AND ADLS

## 2020-10-08 NOTE — ED TRIAGE NOTES
Patient ambulated to rm. 3 c/o of sinus pressure, hard to breath with wearing mask, drainage green, sore throat, ear fullness. Congested cough, x 2 days. mucinex taken one day. Needs work excuse .

## 2020-10-08 NOTE — ED PROVIDER NOTES
[facility] 36  Urgent Care Encounter       CHIEF COMPLAINT       Chief Complaint   Patient presents with    Sinusitis     facial pressure, pain, with ear fullness    Pharyngitis    Cough     congested       Nurses Notes reviewed and I agree except as noted in the HPI. HISTORY OF PRESENT ILLNESS   Chevy Piña is a 38 y.o. female who presents complaints of cough, congestion, and facial pressure. She states this is been worsening and started 2 days ago. She describes it as a dull ache and coughing up thick green drainage. She did admit to taking 1 dose of Mucinex yesterday without any relief. She has tried no other treatment. She states wearing a mask at work is exacerbating her symptoms and making it harder to breathe. She states her boss allows her to pull her mask down which helps. She denies any known fevers or chills. The history is provided by the patient. REVIEW OF SYSTEMS     Review of Systems   Constitutional: Negative for chills and fever. HENT: Positive for congestion, postnasal drip, rhinorrhea, sinus pressure and sore throat. Negative for trouble swallowing. Respiratory: Positive for cough and shortness of breath. Negative for chest tightness. Cardiovascular: Negative for chest pain. Gastrointestinal: Negative for abdominal pain, diarrhea, nausea and vomiting. Musculoskeletal: Negative for myalgias. Skin: Negative for rash. Neurological: Negative for weakness and headaches. PAST MEDICAL HISTORY         Diagnosis Date    Anxiety     Depression     Migraines        SURGICALHISTORY     Patient  has a past surgical history that includes Tubal ligation; Tear duct surgery; and skin biopsy.     CURRENT MEDICATIONS       Discharge Medication List as of 10/8/2020  3:34 PM      CONTINUE these medications which have NOT CHANGED    Details   dicyclomine (BENTYL) 20 MG tablet Take 1 tablet by mouth 4 times daily as needed (abdominal cramps), Disp-20 tablet, R-0Normal      amitriptyline (ELAVIL) 25 MG tablet Take 25 mg by mouth nightlyHistorical Med      venlafaxine (EFFEXOR XR) 150 MG extended release capsule Take 150 mg by mouth dailyHistorical Med      Pantoprazole Sodium (PROTONIX PO) Take by mouthHistorical Med      topiramate (TOPAMAX) 50 MG tablet Take 50 mg by mouth 2 times dailyHistorical Med      hydrOXYzine (VISTARIL) 50 MG capsule Take 50 mg by mouth 3 times daily as needed for ItchingHistorical Med             ALLERGIES     Patient is is allergic to latex. Patients   There is no immunization history on file for this patient. FAMILY HISTORY     Patient's family history includes Diabetes in her mother; High Blood Pressure in her father and mother; Other in her mother. SOCIAL HISTORY     Patient  reports that she has quit smoking. She smoked 0.50 packs per day. She has never used smokeless tobacco. She reports current alcohol use. She reports that she does not use drugs. PHYSICAL EXAM     ED TRIAGE VITALS  BP: 111/65, Temp: 98.5 °F (36.9 °C), Pulse: 86, Resp: 16, SpO2: 97 %,Estimated body mass index is 36.15 kg/m² as calculated from the following:    Height as of 5/16/20: 5' 6\" (1.676 m). Weight as of this encounter: 224 lb (101.6 kg). ,Patient's last menstrual period was 09/29/2020. Physical Exam  Vitals signs and nursing note reviewed. Constitutional:       General: She is not in acute distress. Appearance: Normal appearance. She is obese. HENT:      Head: Normocephalic. Right Ear: Tympanic membrane and ear canal normal. Tympanic membrane is not erythematous. Left Ear: Tympanic membrane and ear canal normal. Tympanic membrane is not erythematous. Nose: Congestion and rhinorrhea present. Mouth/Throat:      Mouth: Mucous membranes are moist.      Pharynx: Posterior oropharyngeal erythema present. No oropharyngeal exudate. Tonsils: No tonsillar exudate. 3+ on the right. 3+ on the left.    Eyes: Conjunctiva/sclera: Conjunctivae normal.   Neck:      Musculoskeletal: Normal range of motion. No neck rigidity. Trachea: Trachea normal.   Cardiovascular:      Rate and Rhythm: Normal rate and regular rhythm. Heart sounds: Normal heart sounds. Pulmonary:      Effort: Pulmonary effort is normal.      Breath sounds: Normal breath sounds. Musculoskeletal: Normal range of motion. Lymphadenopathy:      Cervical: No cervical adenopathy. Right cervical: No superficial, deep or posterior cervical adenopathy. Left cervical: No superficial, deep or posterior cervical adenopathy. Skin:     General: Skin is warm and dry. Neurological:      Mental Status: She is alert and oriented to person, place, and time. Psychiatric:         Behavior: Behavior normal.         DIAGNOSTIC RESULTS     Labs:No results found for this visit on 10/08/20. IMAGING:  None    EKG:  None    URGENT CARE COURSE:     Vitals:    10/08/20 1512   BP: 111/65   Pulse: 86   Resp: 16   Temp: 98.5 °F (36.9 °C)   TempSrc: Temporal   SpO2: 97%   Weight: 224 lb (101.6 kg)       Medications - No data to display         PROCEDURES:  None    FINAL IMPRESSION      1. Acute rhinosinusitis      DISPOSITION/ PLAN   DISPOSITION Decision To Discharge 10/08/2020 03:32:39 PM     Patient refused strep, mono, and influenza testing today. Differential includes acute rhinosinusitis and acute maxillary sinusitis. Will treat with Zithromax, Flonase nasal spray, and Mucinex D as prescribed. Recommend follow-up with PCP if symptoms persist or do not improve in 3 days. Work note provided. Patient voiced understanding and was discharged in stable condition.     PATIENT REFERRED TO:  LISSET Coreas - SHE  [facility] [address] [facility] [address] [facility] [place] [address]      DISCHARGE MEDICATIONS:  Discharge Medication List as of 10/8/2020  3:34 PM      START taking these medications    Details   azithromycin (ZITHROMAX Z-NEVIN) 250 MG tablet Take 2 tablets (500 mg) on Day 1, and then take 1 tablet (250 mg) on days 2 through 5., Disp-1 packet,R-0Normal      fluticasone (FLONASE) 50 MCG/ACT nasal spray 1 spray by Each Nostril route daily, Disp-1 Bottle,R-0Normal      Pseudoephedrine-guaiFENesin (MUCINEX D MAX STRENGTH) 120-1200 MG TB12 Take 1 tablet by mouth 2 times daily as needed (cough/congestion), Disp-20 tablet,R-0Normal             Discharge Medication List as of 10/8/2020  3:34 PM          Discharge Medication List as of 10/8/2020  3:34 PM          LISSET Del Castillo CNP    (Please note that portions of this note were completed with a voice recognition program. Efforts were made to edit the dictations but occasionally words are mis-transcribed.)           LISSET Del Csatillo CNP  10/08/20 1653

## 2020-10-08 NOTE — ED NOTES
Pt discharged. Pt verbalized understanding of discharge instructions and scripts. Pt walked out per self. Pt in stable condition.      Neftaly Gu LPN  16/33/87 [id number]

## 2020-11-06 ENCOUNTER — APPOINTMENT (OUTPATIENT)
Dept: GENERAL RADIOLOGY | Age: 35
End: 2020-11-06
Payer: COMMERCIAL

## 2020-11-06 ENCOUNTER — HOSPITAL ENCOUNTER (EMERGENCY)
Age: 35
Discharge: HOME | End: 2020-11-06
Attending: EMERGENCY MEDICINE
Payer: COMMERCIAL

## 2020-11-06 VITALS
DIASTOLIC BLOOD PRESSURE: 74 MMHG | TEMPERATURE: 98.3 F | OXYGEN SATURATION: 98 % | HEART RATE: 73 BPM | SYSTOLIC BLOOD PRESSURE: 134 MMHG | RESPIRATION RATE: 16 BRPM

## 2020-11-06 PROCEDURE — 73140 X-RAY EXAM OF FINGER(S): CPT

## 2020-11-06 PROCEDURE — 6360000002 HC RX W HCPCS: Performed by: EMERGENCY MEDICINE

## 2020-11-06 PROCEDURE — 90715 TDAP VACCINE 7 YRS/> IM: CPT | Performed by: EMERGENCY MEDICINE

## 2020-11-06 PROCEDURE — 99213 OFFICE O/P EST LOW 20 MIN: CPT | Performed by: EMERGENCY MEDICINE

## 2020-11-06 PROCEDURE — 99213 OFFICE O/P EST LOW 20 MIN: CPT

## 2020-11-06 PROCEDURE — 90471 IMMUNIZATION ADMIN: CPT | Performed by: EMERGENCY MEDICINE

## 2020-11-06 RX ORDER — CEPHALEXIN 500 MG/1
500 CAPSULE ORAL 3 TIMES DAILY
Qty: 21 CAPSULE | Refills: 0 | Status: SHIPPED | OUTPATIENT
Start: 2020-11-06 | End: 2020-11-13

## 2020-11-06 RX ADMIN — TETANUS TOXOID, REDUCED DIPHTHERIA TOXOID AND ACELLULAR PERTUSSIS VACCINE, ADSORBED 0.5 ML: 5; 2.5; 8; 8; 2.5 SUSPENSION INTRAMUSCULAR at 16:05

## 2020-11-06 ASSESSMENT — ENCOUNTER SYMPTOMS
TROUBLE SWALLOWING: 0
EYE DISCHARGE: 0
CONSTIPATION: 0
STRIDOR: 0
VOMITING: 0
NAUSEA: 0
SHORTNESS OF BREATH: 0
SORE THROAT: 0
BLOOD IN STOOL: 0
VOICE CHANGE: 0
WHEEZING: 0
EYE PAIN: 0
ABDOMINAL PAIN: 0
COUGH: 0
BACK PAIN: 0
DIARRHEA: 0
EYE REDNESS: 0
SINUS PRESSURE: 0
FACIAL SWELLING: 0
CHOKING: 0

## 2020-11-06 ASSESSMENT — PAIN DESCRIPTION - FREQUENCY: FREQUENCY: CONTINUOUS

## 2020-11-06 ASSESSMENT — PAIN SCALES - GENERAL: PAINLEVEL_OUTOF10: 10

## 2020-11-06 ASSESSMENT — PAIN DESCRIPTION - PAIN TYPE: TYPE: ACUTE PAIN

## 2020-11-06 ASSESSMENT — PAIN DESCRIPTION - ORIENTATION: ORIENTATION: RIGHT

## 2020-11-06 ASSESSMENT — PAIN DESCRIPTION - LOCATION: LOCATION: FINGER (COMMENT WHICH ONE)

## 2020-11-06 NOTE — ED NOTES
Patient understood instructions verbally,  Follow up with PCP with any concerns, or worse right finger pain  follow up with ED. prescription with patient. ambulated self to lobby,stable condition.       Meredith Nolan LPN  90/45/40 [id number]

## 2020-11-06 NOTE — ED PROVIDER NOTES
[facility] Encounter      CHIEFCOMPLAINT       Chief Complaint   Patient presents with    Foreign Body in Skin       Nurses Notes reviewed and I agree except as noted in the HPI. HISTORY OF PRESENT ILLNESS   June Enamorado is a 38 y.o. female who presents with foreign body sensation right thumb. She thinks she has a metal shaving or small piece of metal in her distal right thumb, something that occurred 24 hours prior. .  She rates pain at 10 out of 10. Injury occurred in [facility]. No redness, swelling, purulent drainage, motor or sensory deficits. Right-hand-dominant. Not up-to-date on tetanus. No history of diabetes or MRSA. REVIEW OF SYSTEMS     Review of Systems   Constitutional: Negative for appetite change, chills, fatigue, fever and unexpected weight change. HENT: Negative for congestion, ear discharge, ear pain, facial swelling, hearing loss, nosebleeds, postnasal drip, sinus pressure, sore throat, trouble swallowing and voice change. Eyes: Negative for pain, discharge, redness and visual disturbance. Respiratory: Negative for cough, choking, shortness of breath, wheezing and stridor. Cardiovascular: Negative for chest pain and leg swelling. Gastrointestinal: Negative for abdominal pain, blood in stool, constipation, diarrhea, nausea and vomiting. Genitourinary: Negative for dysuria, flank pain, frequency, hematuria, urgency, vaginal bleeding and vaginal discharge. Musculoskeletal: Negative for arthralgias, back pain, neck pain and neck stiffness. Skin: Positive for wound. Negative for rash. Puncture wound right thumb with foreign body sensation   Neurological: Negative for dizziness, seizures, syncope, weakness, light-headedness and headaches. Hematological: Negative for adenopathy. Does not bruise/bleed easily. Psychiatric/Behavioral: Negative for confusion, sleep disturbance and suicidal ideas.  The patient is not nervous/anxious. All other systems reviewed and are negative. PAST MEDICAL HISTORY         Diagnosis Date    Anxiety     Depression     Migraines        SURGICAL HISTORY     Patient  has a past surgical history that includes Tubal ligation; Tear duct surgery; and skin biopsy. CURRENT MEDICATIONS       Discharge Medication List as of 11/6/2020  4:08 PM      CONTINUE these medications which have NOT CHANGED    Details   fluticasone (FLONASE) 50 MCG/ACT nasal spray 1 spray by Each Nostril route daily, Disp-1 Bottle,R-0Normal      Pseudoephedrine-guaiFENesin (MUCINEX D MAX STRENGTH) 120-1200 MG TB12 Take 1 tablet by mouth 2 times daily as needed (cough/congestion), Disp-20 tablet,R-0Normal      dicyclomine (BENTYL) 20 MG tablet Take 1 tablet by mouth 4 times daily as needed (abdominal cramps), Disp-20 tablet, R-0Normal      amitriptyline (ELAVIL) 25 MG tablet Take 25 mg by mouth nightlyHistorical Med      venlafaxine (EFFEXOR XR) 150 MG extended release capsule Take 150 mg by mouth dailyHistorical Med      hydrOXYzine (VISTARIL) 50 MG capsule Take 50 mg by mouth 3 times daily as needed for ItchingHistorical Med      Pantoprazole Sodium (PROTONIX PO) Take by mouthHistorical Med      topiramate (TOPAMAX) 50 MG tablet Take 50 mg by mouth 2 times dailyHistorical Med             ALLERGIES     Patient is is allergic to latex. FAMILY HISTORY     Patient'sfamily history includes Diabetes in her mother; High Blood Pressure in her father and mother; Other in her mother. SOCIAL HISTORY     Patient  reports that she has quit smoking. She smoked 0.50 packs per day. She has never used smokeless tobacco. She reports current alcohol use. She reports that she does not use drugs. PHYSICAL EXAM     ED TRIAGE VITALS  BP: 134/74, Temp: 98.3 °F (36.8 °C), Pulse: 73, Resp: 16, SpO2: 98 %  Physical Exam  Vitals signs and nursing note reviewed. Constitutional:       General: She is not in acute distress.      Appearance: She is well-developed. She is not ill-appearing. Comments: Moist membranes, normal airway   HENT:      Head: Normocephalic and atraumatic. Right Ear: Tympanic membrane and external ear normal.      Left Ear: Tympanic membrane and external ear normal.      Nose: Nose normal.      Mouth/Throat:      Pharynx: No oropharyngeal exudate. Comments: Oropharynx normal  Eyes:      General: No scleral icterus. Right eye: No discharge. Left eye: No discharge. Extraocular Movements:      Right eye: Normal extraocular motion. Left eye: Normal extraocular motion. Conjunctiva/sclera: Conjunctivae normal.      Pupils: Pupils are equal, round, and reactive to light. Comments: Conjunctiva clear   Neck:      Musculoskeletal: Normal range of motion. No spinous process tenderness or muscular tenderness. Thyroid: No thyromegaly. Vascular: No JVD. Cardiovascular:      Rate and Rhythm: Normal rate and regular rhythm. Pulses: Normal pulses. Heart sounds: Normal heart sounds, S1 normal and S2 normal. No murmur. No friction rub. No gallop. Pulmonary:      Effort: Pulmonary effort is normal. No tachypnea or respiratory distress. Breath sounds: Normal breath sounds. No stridor. No decreased breath sounds, wheezing, rhonchi or rales. Comments: No cough, chest atraumatic  Chest:      Chest wall: No tenderness. Abdominal:      General: Bowel sounds are normal. There is no distension. Palpations: Abdomen is soft. There is no mass. Tenderness: There is no abdominal tenderness. There is no right CVA tenderness, left CVA tenderness, guarding or rebound. Comments: Soft nontender   Musculoskeletal: Normal range of motion. Right hand: She exhibits tenderness. She exhibits normal range of motion and no bony tenderness. Comments: Puncture wound right thumb distal touch pad without palpable foreign body or visualized foreign body.   No active bleeding. No purulent discharge or evidence of infection. Distal neurovascular intact. Lymphadenopathy:      Cervical: No cervical adenopathy. Right cervical: No superficial cervical adenopathy. Left cervical: No superficial cervical adenopathy. Skin:     General: Skin is warm and dry. Findings: No erythema or rash. Comments: Puncture wound right thumb without evidence of infection   Neurological:      Mental Status: She is alert and oriented to person, place, and time. Cranial Nerves: No cranial nerve deficit. Motor: No abnormal muscle tone. Coordination: Coordination normal.      Deep Tendon Reflexes: Reflexes are normal and symmetric. Reflexes normal.      Comments: Appropriate, no focal findings. Distal neurovascular intact right thumb   Psychiatric:         Behavior: Behavior normal.         Thought Content: Thought content normal.         Judgment: Judgment normal.         DIAGNOSTIC RESULTS   Labs: No results found for this visit on 11/06/20. IMAGING:  XR FINGER RIGHT (MIN 2 VIEWS)   Final Result   No acute osseous findings. No radiopaque foreign body. **This report has been created using voice recognition software. It may contain minor errors which are inherent in voice recognition technology. **      Final report electronically signed by Dr. Emily Rosenberg on 11/6/2020 3:59 PM        URGENT CARE COURSE:     Vitals:    11/06/20 1524   BP: 134/74   Pulse: 73   Resp: 16   Temp: 98.3 °F (36.8 °C)   TempSrc: Temporal   SpO2: 98%       Medications   Tetanus-Diphth-Acell Pertussis (BOOSTRIX) injection 0.5 mL (0.5 mLs Intramuscular Given 11/6/20 1605)   Tolerated well  PROCEDURES:  None  FINALIMPRESSION      1. Puncture wound of right thumb, initial encounter        DISPOSITION/PLAN   DISPOSITION Decision To Discharge 11/06/2020 04:06:33 PM  Nontoxic, well-hydrated, normal airway. No radiographic evidence of foreign body, fracture, dislocation, arthritis.   No evidence of infection. No neurovascular complication. Patient has puncture wound with foreign body sensation. May be radiolucent. Patient received Boostrix IM  And will treat with cephalexin, warm soaks followed by Bactroban ointment, Tylenol, and follow-up with PCP in 5 days for reevaluation and further treatment if problems persist.  Patient understands to go to ED if worse. PATIENT REFERRED TO:  Sandro Dykes, LISSET - SHE [place] [address] [place] [address]  [phone]    Schedule an appointment as soon as possible for a visit in 5 days  Recheck if problems persist, go to emergency if worse    DISCHARGE MEDICATIONS:  Discharge Medication List as of 11/6/2020  4:08 PM      START taking these medications    Details   cephALEXin (KEFLEX) 500 MG capsule Take 1 capsule by mouth 3 times daily for 7 days, Disp-21 capsule,R-0Print      mupirocin (BACTROBAN) 2 % ointment Apply topically 3 times daily.   Apply after warm soaks, Disp-22 g,R-0, Print           Discharge Medication List as of 11/6/2020  4:08 PM          MD Keo Ventura MD  11/06/20 5355

## 2020-11-06 NOTE — ED TRIAGE NOTES
Christine Pederson arrives to room with complaint of fb r thumb symptoms started 1 days ago. Christine Pederson believes she has a piece of metal in her thumb.

## 2021-02-03 ENCOUNTER — HOSPITAL ENCOUNTER (EMERGENCY)
Age: 36
Discharge: HOME | End: 2021-02-03
Payer: COMMERCIAL

## 2021-02-03 VITALS
BODY MASS INDEX: 36.96 KG/M2 | TEMPERATURE: 97.3 F | SYSTOLIC BLOOD PRESSURE: 109 MMHG | HEART RATE: 60 BPM | OXYGEN SATURATION: 98 % | RESPIRATION RATE: 18 BRPM | WEIGHT: 230 LBS | DIASTOLIC BLOOD PRESSURE: 60 MMHG | HEIGHT: 66 IN

## 2021-02-03 DIAGNOSIS — J01.40 ACUTE PANSINUSITIS, RECURRENCE NOT SPECIFIED: Primary | ICD-10-CM

## 2021-02-03 PROCEDURE — 99213 OFFICE O/P EST LOW 20 MIN: CPT

## 2021-02-03 PROCEDURE — 99213 OFFICE O/P EST LOW 20 MIN: CPT | Performed by: NURSE PRACTITIONER

## 2021-02-03 RX ORDER — AMOXICILLIN AND CLAVULANATE POTASSIUM 875; 125 MG/1; MG/1
1 TABLET, FILM COATED ORAL 2 TIMES DAILY
Qty: 14 TABLET | Refills: 0 | Status: SHIPPED | OUTPATIENT
Start: 2021-02-03 | End: 2021-02-10

## 2021-02-03 RX ORDER — FLUTICASONE PROPIONATE 50 MCG
1 SPRAY, SUSPENSION (ML) NASAL DAILY
Qty: 1 BOTTLE | Refills: 0 | Status: SHIPPED | OUTPATIENT
Start: 2021-02-03 | End: 2022-01-26 | Stop reason: SDUPTHER

## 2021-02-03 RX ORDER — GUAIFENESIN 600 MG/1
600 TABLET, EXTENDED RELEASE ORAL 2 TIMES DAILY
Qty: 30 TABLET | Refills: 0 | Status: SHIPPED | OUTPATIENT
Start: 2021-02-03 | End: 2021-02-18

## 2021-02-03 ASSESSMENT — PAIN - FUNCTIONAL ASSESSMENT: PAIN_FUNCTIONAL_ASSESSMENT: PREVENTS OR INTERFERES SOME ACTIVE ACTIVITIES AND ADLS

## 2021-02-03 ASSESSMENT — PAIN DESCRIPTION - PROGRESSION: CLINICAL_PROGRESSION: GRADUALLY WORSENING

## 2021-02-03 ASSESSMENT — ENCOUNTER SYMPTOMS
VOMITING: 0
SORE THROAT: 1
NAUSEA: 0
COUGH: 0
SINUS PRESSURE: 1
SHORTNESS OF BREATH: 0

## 2021-02-03 ASSESSMENT — PAIN DESCRIPTION - LOCATION: LOCATION: FACE

## 2021-02-03 ASSESSMENT — PAIN DESCRIPTION - FREQUENCY: FREQUENCY: CONTINUOUS

## 2021-02-03 ASSESSMENT — PAIN SCALES - GENERAL: PAINLEVEL_OUTOF10: 7

## 2021-02-03 NOTE — ED PROVIDER NOTES
[facility]       CHIEF COMPLAINT       Chief Complaint   Patient presents with    Head Congestion     sore throat       Nurses Notes reviewed and I agree except as noted in the HPI. HISTORY OF PRESENT ILLNESS   Mateus Candelario is a 38 y.o. female who presents for evaluation of sinus pressure, congestion, sore throat that is been ongoing for the past 4 days. She denies any fever or chills. She states that she did take some herbal tea which helped with a sore throat but denies any other medications or interventions at home. Denies any significant cough, shortness of breath, or any sick exposures. The history is provided by the patient. REVIEW OF SYSTEMS     Review of Systems   Constitutional: Negative for chills and fever. HENT: Positive for congestion, postnasal drip, sinus pressure and sore throat. Respiratory: Negative for cough and shortness of breath. Cardiovascular: Negative for chest pain. Gastrointestinal: Negative for nausea and vomiting. Musculoskeletal: Negative for arthralgias and myalgias. Skin: Negative for rash. Neurological: Negative for headaches. PAST MEDICAL HISTORY         Diagnosis Date    Anxiety     Depression     Migraines        SURGICALHISTORY     Patient  has a past surgical history that includes Tubal ligation; Tear duct surgery; and skin biopsy.     CURRENT MEDICATIONS       Previous Medications    AMITRIPTYLINE (ELAVIL) 25 MG TABLET    Take 25 mg by mouth nightly    DICYCLOMINE (BENTYL) 20 MG TABLET    Take 1 tablet by mouth 4 times daily as needed (abdominal cramps)    HYDROXYZINE (VISTARIL) 50 MG CAPSULE    Take 50 mg by mouth 3 times daily as needed for Itching    PANTOPRAZOLE SODIUM (PROTONIX PO)    Take by mouth    PSEUDOEPHEDRINE-GUAIFENESIN (MUCINEX D MAX STRENGTH) 120-1200 MG TB12    Take 1 tablet by mouth 2 times daily as needed (cough/congestion)    TOPIRAMATE (TOPAMAX) 50 MG TABLET Take 50 mg by mouth 2 times daily    VENLAFAXINE (EFFEXOR XR) 150 MG EXTENDED RELEASE CAPSULE    Take 150 mg by mouth daily       ALLERGIES     Patient is is allergic to latex. Patients   Immunization History   Administered Date(s) Administered    Tdap (Boostrix, Adacel) 11/06/2020       FAMILY HISTORY     Patient's family history includes Diabetes in her mother; High Blood Pressure in her father and mother; Other in her mother. SOCIAL HISTORY     Patient  reports that she has quit smoking. She smoked 0.50 packs per day. She has never used smokeless tobacco. She reports current alcohol use. She reports that she does not use drugs. PHYSICAL EXAM     ED TRIAGE VITALS  BP: 109/60, Temp: 97.3 °F (36.3 °C), Pulse: 60, Resp: 18, SpO2: 98 %,Estimated body mass index is 37.12 kg/m² as calculated from the following:    Height as of this encounter: 5' 6\" (1.676 m). Weight as of this encounter: 230 lb (104.3 kg). ,Patient's last menstrual period was 01/13/2021. Physical Exam  Vitals signs and nursing note reviewed. Constitutional:       General: She is not in acute distress. Appearance: She is well-developed. She is not diaphoretic. HENT:      Right Ear: Tympanic membrane is bulging. Tympanic membrane is not erythematous. Left Ear: Tympanic membrane is bulging. Tympanic membrane is not erythematous. Nose:      Right Sinus: Maxillary sinus tenderness and frontal sinus tenderness present. Left Sinus: Maxillary sinus tenderness and frontal sinus tenderness present. Mouth/Throat:      Mouth: Mucous membranes are moist.      Pharynx: Oropharynx is clear. Eyes:      Conjunctiva/sclera:      Right eye: Right conjunctiva is not injected. Left eye: Left conjunctiva is not injected. Pupils: Pupils are equal.   Neck:      Musculoskeletal: Normal range of motion. Cardiovascular:      Rate and Rhythm: Normal rate and regular rhythm. Heart sounds: No murmur.    Pulmonary: Effort: Pulmonary effort is normal. No respiratory distress. Breath sounds: Normal breath sounds. Musculoskeletal:      Right knee: She exhibits normal range of motion. Left knee: She exhibits normal range of motion. Lymphadenopathy:      Head:      Right side of head: No tonsillar adenopathy. Left side of head: No tonsillar adenopathy. Cervical: No cervical adenopathy. Skin:     General: Skin is warm. Findings: No rash. Neurological:      Mental Status: She is alert and oriented to person, place, and time. Psychiatric:         Behavior: Behavior normal.         DIAGNOSTIC RESULTS     Labs:No results found for this visit on 02/03/21. IMAGING:    No orders to display         EKG:  none    URGENT CARE COURSE:     Vitals:    02/03/21 1358   BP: 109/60   Pulse: 60   Resp: 18   Temp: 97.3 °F (36.3 °C)   TempSrc: Temporal   SpO2: 98%   Weight: 230 lb (104.3 kg)   Height: 5' 6\" (1.676 m)       Medications - No data to display         PROCEDURES:  None    FINAL IMPRESSION      1. Acute pansinusitis, recurrence not specified          DISPOSITION/ PLAN     Exam is consistent with pansinusitis at this time. I discussed with the patient plan to treat with oral Augmentin and she is also given a prescription for Mucinex and Flonase. She is advised to follow-up on an outpatient basis as needed and is agreeable to plan as discussed.       PATIENT REFERRED TO:  LISSET Malin - CNP  [address] [facility] [address] / [place] [address]      DISCHARGE MEDICATIONS:  New Prescriptions    AMOXICILLIN-CLAVULANATE (AUGMENTIN) 875-125 MG PER TABLET    Take 1 tablet by mouth 2 times daily for 7 days    FLUTICASONE (FLONASE) 50 MCG/ACT NASAL SPRAY    1 spray by Each Nostril route daily    GUAIFENESIN (MUCINEX) 600 MG EXTENDED RELEASE TABLET    Take 1 tablet by mouth 2 times daily for 15 days       Discontinued Medications    FLUTICASONE (FLONASE) 50 MCG/ACT NASAL SPRAY    1 spray by Each Nostril route daily       Current Discharge Medication List      CONTINUE these medications which have CHANGED    Details   fluticasone (FLONASE) 50 MCG/ACT nasal spray 1 spray by Each Nostril route daily  Qty: 1 Bottle, Refills: 0             LISSET Lozano CNP    (Please note that portions of this note were completed with a voice recognition program. Efforts were made to edit the dictations but occasionally words are mis-transcribed.)          LISSET Lozano CNP  02/03/21 1212

## 2021-02-03 NOTE — LETTER
[facility]  [address] [facility] [address]  Phone: [phone]               February 3, 2021    Patient: Lisset Steven   YOB: 1985   Date of Visit: 2/3/2021       To Whom It May Concern:    Namrata Chanel was seen and treated in our emergency department on 2/3/2021. She may return to work 2/4/2021.     Sincerely,       [facility], RN         Signature:__________________________________

## 2021-02-03 NOTE — ED TRIAGE NOTES
Patient to room with c/o head congestion, generalized facial pain, headache, and sore throat beginning three days ago.

## 2021-02-12 ENCOUNTER — HOSPITAL ENCOUNTER (EMERGENCY)
Age: 36
Discharge: HOME | End: 2021-02-12
Payer: COMMERCIAL

## 2021-02-12 VITALS
HEIGHT: 67 IN | OXYGEN SATURATION: 98 % | RESPIRATION RATE: 16 BRPM | DIASTOLIC BLOOD PRESSURE: 80 MMHG | TEMPERATURE: 97.8 F | WEIGHT: 224 LBS | HEART RATE: 88 BPM | SYSTOLIC BLOOD PRESSURE: 126 MMHG | BODY MASS INDEX: 35.16 KG/M2

## 2021-02-12 DIAGNOSIS — S05.01XA CORNEAL ABRASION, RIGHT, INITIAL ENCOUNTER: Primary | ICD-10-CM

## 2021-02-12 PROCEDURE — 99213 OFFICE O/P EST LOW 20 MIN: CPT

## 2021-02-12 PROCEDURE — 99214 OFFICE O/P EST MOD 30 MIN: CPT | Performed by: NURSE PRACTITIONER

## 2021-02-12 RX ORDER — GENTAMICIN SULFATE 3 MG/ML
2 SOLUTION/ DROPS OPHTHALMIC 2 TIMES DAILY
Qty: 1 BOTTLE | Refills: 0 | Status: SHIPPED | OUTPATIENT
Start: 2021-02-12 | End: 2021-02-22

## 2021-02-12 RX ORDER — PROPARACAINE HYDROCHLORIDE 5 MG/ML
1 SOLUTION/ DROPS OPHTHALMIC ONCE
Status: DISCONTINUED | OUTPATIENT
Start: 2021-02-12 | End: 2021-02-12 | Stop reason: HOSPADM

## 2021-02-12 ASSESSMENT — VISUAL ACUITY
OS: 20/25
OU: 20/25

## 2021-02-12 ASSESSMENT — ENCOUNTER SYMPTOMS
EYE PAIN: 0
PHOTOPHOBIA: 0
EYE ITCHING: 0
EYE DISCHARGE: 0
EYE REDNESS: 1

## 2021-02-12 NOTE — LETTER
[facility]  [address] [place] [address]  Phone: [phone]               February 12, 2021    Patient: Joy Phillips   YOB: 1985   Date of Visit: 2/12/2021       To Whom It May Concern:    Candi Pérez was seen and treated in our emergency department on 2/12/2021. She may return to work on 2/15/2021.       Sincerely,       LISSET Jo NP         Signature:__________________________________

## 2021-02-13 NOTE — ED NOTES
Pt complains of having gotten something in right eye today now it is very painful     Claudene Jun, RN  02/12/21 1926

## 2021-02-13 NOTE — ED PROVIDER NOTES
[place]  Urgent Care Encounter       CHIEF COMPLAINT       Chief Complaint   Patient presents with    Eye Problem     right       Nurses Notes reviewed and I agree except as noted in the HPI. HISTORY OF PRESENT ILLNESS   Marcel Gonzalez is a 38 y.o. female who presents     Patient is present in the urgent care today with complaints of injury to right eye. She states that she works at [employer], and one of the presses caused a piece of plastic to pop off and strike her in the right eye. She is currently wearing eyeglasses. She denies any visual changes. There is noted to be some redness and irritation to right eye. She denies any drainage from site. REVIEW OF SYSTEMS     Review of Systems   Constitutional: Negative for chills, fatigue and fever. Eyes: Positive for redness. Negative for photophobia, pain, discharge, itching and visual disturbance. Skin: Negative for rash. PAST MEDICAL HISTORY         Diagnosis Date    Anxiety     Depression     Migraines        SURGICALHISTORY     Patient  has a past surgical history that includes Tubal ligation; Tear duct surgery; and skin biopsy.     CURRENT MEDICATIONS       Discharge Medication List as of 2/12/2021  7:37 PM      CONTINUE these medications which have NOT CHANGED    Details   fluticasone (FLONASE) 50 MCG/ACT nasal spray 1 spray by Each Nostril route daily, Disp-1 Bottle, R-0Normal      guaiFENesin (MUCINEX) 600 MG extended release tablet Take 1 tablet by mouth 2 times daily for 15 days, Disp-30 tablet, R-0Normal      Pseudoephedrine-guaiFENesin (MUCINEX D MAX STRENGTH) 120-1200 MG TB12 Take 1 tablet by mouth 2 times daily as needed (cough/congestion), Disp-20 tablet,R-0Normal      dicyclomine (BENTYL) 20 MG tablet Take 1 tablet by mouth 4 times daily as needed (abdominal cramps), Disp-20 tablet, R-0Normal      amitriptyline (ELAVIL) 25 MG tablet Take 25 mg by mouth nightlyHistorical Med      venlafaxine (EFFEXOR XR) 150 MG extended release capsule Take 150 mg by mouth dailyHistorical Med      hydrOXYzine (VISTARIL) 50 MG capsule Take 50 mg by mouth 3 times daily as needed for ItchingHistorical Med      Pantoprazole Sodium (PROTONIX PO) Take by mouthHistorical Med      topiramate (TOPAMAX) 50 MG tablet Take 50 mg by mouth 2 times dailyHistorical Med             ALLERGIES     Patient is is allergic to latex. Patients   Immunization History   Administered Date(s) Administered    Tdap (Boostrix, Adacel) 11/06/2020       FAMILY HISTORY     Patient's family history includes Diabetes in her mother; High Blood Pressure in her father and mother; Other in her mother. SOCIAL HISTORY     Patient  reports that she has quit smoking. She smoked 0.50 packs per day. She has never used smokeless tobacco. She reports current alcohol use. She reports that she does not use drugs. PHYSICAL EXAM     ED TRIAGE VITALS  BP: 126/80, Temp: 97.8 °F (36.6 °C), Pulse: 88, Resp: 16, SpO2: 98 %,Estimated body mass index is 35.08 kg/m² as calculated from the following:    Height as of this encounter: 5' 7\" (1.702 m). Weight as of this encounter: 224 lb (101.6 kg). ,Patient's last menstrual period was 02/12/2021. Physical Exam  Constitutional:       General: She is not in acute distress. Appearance: Normal appearance. She is not ill-appearing, toxic-appearing or diaphoretic. Eyes:      General: Lids are normal. No scleral icterus. Right eye: No discharge. Left eye: No discharge. Extraocular Movements: Extraocular movements intact. Pupils: Pupils are equal, round, and reactive to light. Pupils are equal.      Right eye: Pupil is round, reactive and not sluggish. Corneal abrasion and fluorescein uptake present. Left eye: Pupil is round, reactive and not sluggish. Slit lamp exam:     Right eye: No foreign body. Pulmonary:      Effort: Pulmonary effort is normal. No respiratory distress. Medication List as of 2/12/2021  7:37 PM          LISSET Nobles NP    (Please note that portions of this note were completed with a voice recognition program. Efforts were made to edit the dictations but occasionally words are mis-transcribed.)          LISSET Harden NP  02/12/21 1950

## 2021-02-25 ENCOUNTER — HOSPITAL ENCOUNTER (EMERGENCY)
Age: 36
Discharge: HOME | End: 2021-02-25
Payer: COMMERCIAL

## 2021-02-25 VITALS
WEIGHT: 220 LBS | OXYGEN SATURATION: 98 % | RESPIRATION RATE: 16 BRPM | BODY MASS INDEX: 35.36 KG/M2 | SYSTOLIC BLOOD PRESSURE: 122 MMHG | HEIGHT: 66 IN | TEMPERATURE: 98 F | DIASTOLIC BLOOD PRESSURE: 67 MMHG | HEART RATE: 82 BPM

## 2021-02-25 DIAGNOSIS — J01.01 ACUTE RECURRENT MAXILLARY SINUSITIS: Primary | ICD-10-CM

## 2021-02-25 PROCEDURE — 99213 OFFICE O/P EST LOW 20 MIN: CPT | Performed by: NURSE PRACTITIONER

## 2021-02-25 PROCEDURE — 99213 OFFICE O/P EST LOW 20 MIN: CPT

## 2021-02-25 RX ORDER — AZITHROMYCIN 250 MG/1
TABLET, FILM COATED ORAL
Qty: 1 PACKET | Refills: 0 | Status: SHIPPED | OUTPATIENT
Start: 2021-02-25 | End: 2021-03-01

## 2021-02-25 ASSESSMENT — ENCOUNTER SYMPTOMS
GASTROINTESTINAL NEGATIVE: 1
SINUS PRESSURE: 1
SINUS PAIN: 1
SORE THROAT: 0
RESPIRATORY NEGATIVE: 1

## 2021-02-25 ASSESSMENT — PAIN SCALES - GENERAL: PAINLEVEL_OUTOF10: 7

## 2021-02-26 NOTE — ED PROVIDER NOTES
[facility]       CHIEF COMPLAINT       Chief Complaint   Patient presents with    Sinusitis       Nurses Notes reviewed and I agree except as noted in the HPI. HISTORY OF PRESENT ILLNESS   Olga Werner is a 38 y.o. female who presents to urgent care with frontal and maxillary sinus pain and pressure. Onset of symptoms today complains of facial pressure, right side of face, headache and congestion. Denies sore throat, fever, no ear pain, no SOB, no coughing. States she was previously treated at the beginning of the month, did not finish augmentin because of GI upset. Denies over the counter medications used. She needs a work note    REVIEW OF SYSTEMS     Review of Systems   Constitutional: Negative. HENT: Positive for congestion, sinus pressure and sinus pain. Negative for ear pain and sore throat. Respiratory: Negative. Cardiovascular: Negative. Gastrointestinal: Negative. Genitourinary: Negative. Musculoskeletal: Negative. Neurological: Positive for headaches. PAST MEDICAL HISTORY         Diagnosis Date    Anxiety     Depression     Migraines        SURGICALHISTORY     Patient  has a past surgical history that includes Tubal ligation; Tear duct surgery; and skin biopsy.     CURRENT MEDICATIONS       Discharge Medication List as of 2/25/2021  7:26 PM      CONTINUE these medications which have NOT CHANGED    Details   fluticasone (FLONASE) 50 MCG/ACT nasal spray 1 spray by Each Nostril route daily, Disp-1 Bottle, R-0Normal      Pseudoephedrine-guaiFENesin (MUCINEX D MAX STRENGTH) 120-1200 MG TB12 Take 1 tablet by mouth 2 times daily as needed (cough/congestion), Disp-20 tablet,R-0Normal      dicyclomine (BENTYL) 20 MG tablet Take 1 tablet by mouth 4 times daily as needed (abdominal cramps), Disp-20 tablet, R-0Normal      amitriptyline (ELAVIL) 25 MG tablet Take 25 mg by mouth nightlyHistorical Med      venlafaxine (EFFEXOR XR) 150 MG extended release capsule Take 150 mg by mouth dailyHistorical Med      hydrOXYzine (VISTARIL) 50 MG capsule Take 50 mg by mouth 3 times daily as needed for ItchingHistorical Med      Pantoprazole Sodium (PROTONIX PO) Take by mouthHistorical Med      topiramate (TOPAMAX) 50 MG tablet Take 50 mg by mouth 2 times dailyHistorical Med             ALLERGIES     Patient is is allergic to latex. Patients   Immunization History   Administered Date(s) Administered    Tdap (Boostrix, Adacel) 11/06/2020       FAMILY HISTORY     Patient's family history includes Diabetes in her mother; High Blood Pressure in her father and mother; Other in her mother. SOCIAL HISTORY     Patient  reports that she has quit smoking. She smoked 0.50 packs per day. She has never used smokeless tobacco. She reports current alcohol use. She reports that she does not use drugs. PHYSICAL EXAM     ED TRIAGE VITALS  BP: 122/67, Temp: 98 °F (36.7 °C), Pulse: 82, Resp: 16, SpO2: 98 %,Estimated body mass index is 35.51 kg/m² as calculated from the following:    Height as of this encounter: 5' 6\" (1.676 m). Weight as of this encounter: 220 lb (99.8 kg). ,Patient's last menstrual period was 02/12/2021. Physical Exam  Constitutional:       Appearance: She is obese. HENT:      Head: Normocephalic. Nose: Congestion present. Right Turbinates: Swollen. Left Turbinates: Swollen. Right Sinus: Maxillary sinus tenderness and frontal sinus tenderness present. Mouth/Throat:      Mouth: Mucous membranes are moist.      Pharynx: Oropharynx is clear. Neck:      Musculoskeletal: Normal range of motion. Cardiovascular:      Rate and Rhythm: Normal rate. Pulmonary:      Effort: Pulmonary effort is normal.      Breath sounds: Normal breath sounds. Abdominal:      Palpations: Abdomen is soft. Musculoskeletal: Normal range of motion. Skin:     General: Skin is warm. Neurological:      Mental Status: She is alert. DIAGNOSTIC RESULTS     Labs:No results found for this visit on 02/25/21. IMAGING:    No orders to display         URGENT CARE COURSE:     Vitals:    02/25/21 1857   BP: 122/67   Pulse: 82   Resp: 16   Temp: 98 °F (36.7 °C)   TempSrc: Temporal   SpO2: 98%   Weight: 220 lb (99.8 kg)   Height: 5' 6\" (1.676 m)       Medications - No data to display         PROCEDURES:  None    FINAL IMPRESSION      1. Acute recurrent maxillary sinusitis          DISPOSITION/ PLAN   Plan is to discharge home, medically stable. Discussed treatment plan. Also discussed and educated patient on antibiotic use and resistance. She states she has taken zpak in the past and worked well. Discussed with patient long term treatment with seasonal antihistamine use. Verbalized understanding, no questions at this time.    Take antibiotics as prescribed  Take Claritin D for the next 5 days then start Claritin allergy and take daily through spring months  Flonase as directed  Sinus rinses  Drink plenty of fluids   Cool mist humidifier at bedtime   Follow up with PCP if no improvements in the next 5-7 days  Go to ER if symptoms worsen      PATIENT REFERRED TO:  LISSET Modi CNP  [address] [address] / [facility] [address]      DISCHARGE MEDICATIONS:  Discharge Medication List as of 2/25/2021  7:26 PM      START taking these medications    Details   azithromycin (ZITHROMAX Z-NEVIN) 250 MG tablet Take 2 tablets (500 mg) on Day 1, and then take 1 tablet (250 mg) on days 2 through 5., Disp-1 packet, R-0Normal             Discharge Medication List as of 2/25/2021  7:26 PM          Discharge Medication List as of 2/25/2021  7:26 PM          LISSET Klein CNP    (Please note that portions of this note were completed with a voice recognition program. Efforts were made to edit the dictations but occasionally words are mis-transcribed.)           LISSET Klein CNP  02/25/21 LISSET Martins CNP  02/25/21 [address]

## 2021-02-26 NOTE — ED NOTES
Pt. Released in stable condition, ambulated per self to private car. Instructed pt to follow-up with family doctor as needed for recheck or go directly to the emergency department for any concerns/worsening conditions. Pt. Verbalized understanding of instructions. No questions at this time. RX in hand.       Kiran Hough RN  02/25/21 9632

## 2021-07-02 ENCOUNTER — HOSPITAL ENCOUNTER (EMERGENCY)
Age: 36
Discharge: HOME | End: 2021-07-02
Payer: COMMERCIAL

## 2021-07-02 VITALS
BODY MASS INDEX: 36.15 KG/M2 | WEIGHT: 224 LBS | SYSTOLIC BLOOD PRESSURE: 117 MMHG | RESPIRATION RATE: 16 BRPM | TEMPERATURE: 97.8 F | OXYGEN SATURATION: 98 % | DIASTOLIC BLOOD PRESSURE: 69 MMHG | HEART RATE: 99 BPM

## 2021-07-02 DIAGNOSIS — K08.89 PAIN, DENTAL: Primary | ICD-10-CM

## 2021-07-02 PROCEDURE — 99213 OFFICE O/P EST LOW 20 MIN: CPT | Performed by: NURSE PRACTITIONER

## 2021-07-02 PROCEDURE — 99213 OFFICE O/P EST LOW 20 MIN: CPT

## 2021-07-02 RX ORDER — AMOXICILLIN AND CLAVULANATE POTASSIUM 875; 125 MG/1; MG/1
1 TABLET, FILM COATED ORAL 2 TIMES DAILY
Qty: 20 TABLET | Refills: 0 | Status: SHIPPED | OUTPATIENT
Start: 2021-07-02 | End: 2021-07-12

## 2021-07-02 RX ORDER — LIDOCAINE HYDROCHLORIDE 20 MG/ML
15 SOLUTION OROPHARYNGEAL
Qty: 100 ML | Refills: 1 | Status: SHIPPED | OUTPATIENT
Start: 2021-07-02 | End: 2021-09-01

## 2021-07-02 RX ORDER — INDOMETHACIN 50 MG/1
50 CAPSULE ORAL 2 TIMES DAILY PRN
Qty: 20 CAPSULE | Refills: 0 | Status: SHIPPED | OUTPATIENT
Start: 2021-07-02

## 2021-07-02 ASSESSMENT — ENCOUNTER SYMPTOMS
EYE REDNESS: 0
TROUBLE SWALLOWING: 0
RHINORRHEA: 0
SORE THROAT: 0
SHORTNESS OF BREATH: 0
COUGH: 0
DIARRHEA: 0
EYE DISCHARGE: 0
NAUSEA: 0
FACIAL SWELLING: 1
VOMITING: 0

## 2021-07-02 ASSESSMENT — PAIN SCALES - GENERAL: PAINLEVEL_OUTOF10: 7

## 2021-07-02 ASSESSMENT — PAIN DESCRIPTION - LOCATION: LOCATION: FACE

## 2021-07-02 ASSESSMENT — PAIN DESCRIPTION - PAIN TYPE: TYPE: ACUTE PAIN

## 2021-07-02 NOTE — ED PROVIDER NOTES
[facility] Encounter      CHIEFCOMPLAINT       Chief Complaint   Patient presents with    Dental Pain     Pt got her [employer] worked on Monday. Pt has been having pain in mouth and facial since then. Nurses Notes reviewed and I agree except as noted in the HPI. HISTORY OF PRESENT ILLNESS   James Centeno is a 43 y.o. female who presents with complaints of left upper/lower dental pain. Onset Monday, worsening. Patient had dental procedure done, states she had something placed in her teeth. She complains of dental pain with facial swelling. No trouble swallowing. No improvement with over-the-counter medicine. REVIEW OF SYSTEMS     Review of Systems   Constitutional: Negative for chills, diaphoresis, fatigue and fever. HENT: Positive for dental problem and facial swelling. Negative for congestion, ear pain, rhinorrhea, sore throat and trouble swallowing. Eyes: Negative for discharge and redness. Respiratory: Negative for cough and shortness of breath. Cardiovascular: Negative for chest pain. Gastrointestinal: Negative for diarrhea, nausea and vomiting. Genitourinary: Negative for decreased urine volume. Musculoskeletal: Negative for neck pain and neck stiffness. Skin: Negative for rash. Neurological: Negative for headaches. Hematological: Negative for adenopathy. Psychiatric/Behavioral: Negative for sleep disturbance. PAST MEDICAL HISTORY         Diagnosis Date    Anxiety     Depression     Migraines        SURGICAL HISTORY     Patient  has a past surgical history that includes Tubal ligation; Tear duct surgery; and skin biopsy.     CURRENT MEDICATIONS       Previous Medications    AMITRIPTYLINE (ELAVIL) 25 MG TABLET    Take 25 mg by mouth nightly    DICYCLOMINE (BENTYL) 20 MG TABLET    Take 1 tablet by mouth 4 times daily as needed (abdominal cramps)    FLUTICASONE (FLONASE) 50 MCG/ACT NASAL SPRAY    1 spray by Each Nostril route daily    HYDROXYZINE (VISTARIL) 50 MG CAPSULE    Take 50 mg by mouth 3 times daily as needed for Itching    PANTOPRAZOLE SODIUM (PROTONIX PO)    Take by mouth    PSEUDOEPHEDRINE-GUAIFENESIN (MUCINEX D MAX STRENGTH) 120-1200 MG TB12    Take 1 tablet by mouth 2 times daily as needed (cough/congestion)    TOPIRAMATE (TOPAMAX) 50 MG TABLET    Take 50 mg by mouth 2 times daily    VENLAFAXINE (EFFEXOR XR) 150 MG EXTENDED RELEASE CAPSULE    Take 150 mg by mouth daily       ALLERGIES     Patient is is allergic to latex. FAMILY HISTORY     Patient'sfamily history includes Diabetes in her mother; High Blood Pressure in her father and mother; Other in her mother. SOCIAL HISTORY     Patient  reports that she has quit smoking. She smoked 0.50 packs per day. She has never used smokeless tobacco. She reports current alcohol use. She reports that she does not use drugs. PHYSICAL EXAM     ED TRIAGE VITALS  BP: 117/69, Temp: 97.8 °F (36.6 °C), Pulse: 99, Resp: 16, SpO2: 98 %  Physical Exam  Vitals and nursing note reviewed. Constitutional:       General: She is not in acute distress. Appearance: Normal appearance. HENT:      Head: Normocephalic and atraumatic. Jaw: There is normal jaw occlusion. Right Ear: Hearing, tympanic membrane, ear canal and external ear normal.      Left Ear: Hearing, tympanic membrane, ear canal and external ear normal.      Nose: Nose normal. No congestion. Mouth/Throat:      Lips: Pink. No lesions. Mouth: Mucous membranes are moist. No oral lesions. Dentition: Dental tenderness present. No gingival swelling, dental abscesses (no obvious abscess) or gum lesions. Pharynx: Oropharynx is clear. Uvula midline. Tonsils: No tonsillar abscesses. Comments: Left upper/lower dental pain  Eyes:      General: No scleral icterus. Conjunctiva/sclera: Conjunctivae normal.   Pulmonary:      Effort: Pulmonary effort is normal. No respiratory distress. Musculoskeletal:      Cervical back: Normal range of motion. Lymphadenopathy:      Cervical: No cervical adenopathy. Skin:     General: Skin is warm and dry. Capillary Refill: Capillary refill takes less than 2 seconds. Coloration: Skin is not jaundiced. Findings: No rash. Neurological:      Mental Status: She is alert and oriented to person, place, and time. Sensory: Sensation is intact. Psychiatric:         Mood and Affect: Mood normal.         Behavior: Behavior normal. Behavior is cooperative. DIAGNOSTIC RESULTS   Labs: No results found for this visit on 07/02/21. IMAGING:  No orders to display     URGENT CARE COURSE:     Vitals:    07/02/21 1342   BP: 117/69   Pulse: 99   Resp: 16   Temp: 97.8 °F (36.6 °C)   TempSrc: Temporal   SpO2: 98%   Weight: 224 lb (101.6 kg)       Medications - No data to display  PROCEDURES:  None  FINALIMPRESSION      1. Pain, dental        DISPOSITION/PLAN   DISPOSITION Decision To Discharge 07/02/2021 02:02:55 PM  Nontoxic, no distress. Exam consistent with left upper/lower dental pain. Occasions as prescribed. Diet as tolerated. If symptoms worsen go to ER. PATIENT REFERRED TO:  LISSET Dozier CNP [address] [place] [address]  [phone]      Follow-up as needed. Medications as prescribed. Daily yogurt/probiotic. Diet as tolerated. If symptoms worsen go to ER.     DISCHARGE MEDICATIONS:  New Prescriptions    AMOXICILLIN-CLAVULANATE (AUGMENTIN) 875-125 MG PER TABLET    Take 1 tablet by mouth 2 times daily for 10 days    INDOMETHACIN (INDOCIN) 50 MG CAPSULE    Take 1 capsule by mouth 2 times daily as needed for Pain (Take with food.)    LIDOCAINE VISCOUS HCL (XYLOCAINE) 2 % SOLN SOLUTION    Take 15 mLs by mouth every 3 hours as needed for Irritation or Dental Pain Swish and spit     Current Discharge Medication List          [address], LISSET - CNP  07/02/21 8558

## 2021-07-02 NOTE — ED NOTES
Pt. Released in stable condition, ambulated per self to private car. Instructed pt to follow-up with family doctor as needed for recheck or go directly to the emergency department for any concerns/worsening conditions. Pt. Verbalized understanding of instructions. No questions at this time. RX in hand.       Cortez Christianson RN  07/02/21 9908

## 2021-07-02 NOTE — ED TRIAGE NOTES
Pt walked to room 7. Pt here with complaints of facial pain. Mouth  hurts due to having treatment on her cavities. That is what is affecting her face.

## 2021-07-02 NOTE — LETTER
[facility]  [address] [place] [id number]  Phone: [phone]               July 2, 2021    Patient: Osman Vaughan   YOB: 1985   Date of Visit: 7/2/2021       To Whom It May Concern:    Paige Mixon was seen and treated in our emergency department on 7/2/2021. She may return to work on 7/4/21.       Sincerely,       LISSET Hoyos CNP         Signature:__________________________________

## 2021-09-01 ENCOUNTER — HOSPITAL ENCOUNTER (EMERGENCY)
Age: 36
Discharge: HOME | End: 2021-09-01
Attending: EMERGENCY MEDICINE
Payer: COMMERCIAL

## 2021-09-01 VITALS
HEART RATE: 99 BPM | WEIGHT: 243 LBS | SYSTOLIC BLOOD PRESSURE: 119 MMHG | TEMPERATURE: 98 F | RESPIRATION RATE: 16 BRPM | OXYGEN SATURATION: 97 % | HEIGHT: 67 IN | BODY MASS INDEX: 38.14 KG/M2 | DIASTOLIC BLOOD PRESSURE: 67 MMHG

## 2021-09-01 DIAGNOSIS — R10.84 GENERALIZED ABDOMINAL PAIN: Primary | ICD-10-CM

## 2021-09-01 LAB
ALBUMIN SERPL-MCNC: 3.6 GM/DL (ref 3.4–5)
ALP BLD-CCNC: 87 U/L (ref 46–116)
ALT SERPL-CCNC: 21 U/L (ref 14–63)
ANION GAP: 13 MEQ/L (ref 8–16)
AST SERPL-CCNC: 15 U/L (ref 15–37)
BASOPHILS # BLD: 0.4 % (ref 0–3)
BILIRUB SERPL-MCNC: 0.2 MG/DL (ref 0.2–1)
BILIRUBIN URINE: NEGATIVE
BLOOD, URINE: NEGATIVE
BUN BLDV-MCNC: 13 MG/DL (ref 7–18)
CHARACTER, URINE: NORMAL
CHLORIDE BLD-SCNC: 104 MEQ/L (ref 98–107)
CO2: 23 MEQ/L (ref 21–32)
COLOR: YELLOW
CREAT SERPL-MCNC: 0.8 MG/DL (ref 0.6–1.3)
EOSINOPHILS RELATIVE PERCENT: 1.4 % (ref 0–4)
GFR, ESTIMATED: > 90 ML/MIN/1.73M2
GLUCOSE BLD-MCNC: 112 MG/DL (ref 74–106)
GLUCOSE, URINE: NEGATIVE MG/DL
HCT VFR BLD CALC: 39.1 % (ref 37–47)
HEMOGLOBIN: 12.8 GM/DL (ref 12–16)
KETONES, URINE: NEGATIVE
LEUKOCYTE ESTERASE, URINE: NEGATIVE
LIPASE: 116 U/L (ref 73–393)
LYMPHOCYTES # BLD: 25.6 % (ref 15–47)
MCH RBC QN AUTO: 30.2 PG (ref 27–31)
MCHC RBC AUTO-ENTMCNC: 32.7 GM/DL (ref 33–37)
MCV RBC AUTO: 92.6 FL (ref 81–99)
MONOCYTES: 6.4 % (ref 0–12)
NITRITE, URINE: NEGATIVE
PDW BLD-RTO: 13.6 % (ref 11.5–14.5)
PH UA: 7.5 (ref 5–9)
PLATELET # BLD: 290 THOU/MM3 (ref 130–400)
PMV BLD AUTO: 6.9 FL (ref 7.4–10.4)
POC CALCIUM: 8.7 MG/DL (ref 8.5–10.1)
POTASSIUM SERPL-SCNC: 4 MEQ/L (ref 3.5–5.1)
PREGNANCY, URINE: NEGATIVE
PROTEIN UA: NEGATIVE MG/DL
RBC # BLD: 4.22 MILL/MM3 (ref 4.2–5.4)
REFLEX TO URINE C & S: NORMAL
SEGS: 66.2 % (ref 43–75)
SODIUM BLD-SCNC: 140 MEQ/L (ref 136–145)
SPECIFIC GRAVITY UA: 1.02 (ref 1–1.03)
TOTAL PROTEIN: 7.3 GM/DL (ref 6.4–8.2)
UROBILINOGEN, URINE: 0.2 EU/DL (ref 0–1)
WBC # BLD: 9.1 THOU/MM3 (ref 4.8–10.8)

## 2021-09-01 PROCEDURE — 36415 COLL VENOUS BLD VENIPUNCTURE: CPT

## 2021-09-01 PROCEDURE — 99282 EMERGENCY DEPT VISIT SF MDM: CPT

## 2021-09-01 PROCEDURE — 85025 COMPLETE CBC W/AUTO DIFF WBC: CPT

## 2021-09-01 PROCEDURE — 81001 URINALYSIS AUTO W/SCOPE: CPT

## 2021-09-01 PROCEDURE — 84703 CHORIONIC GONADOTROPIN ASSAY: CPT

## 2021-09-01 PROCEDURE — 80053 COMPREHEN METABOLIC PANEL: CPT

## 2021-09-01 PROCEDURE — 83690 ASSAY OF LIPASE: CPT

## 2021-09-01 RX ORDER — PANTOPRAZOLE SODIUM 40 MG/1
40 TABLET, DELAYED RELEASE ORAL
Qty: 30 TABLET | Refills: 0 | Status: SHIPPED | OUTPATIENT
Start: 2021-09-01

## 2021-09-01 RX ORDER — DICYCLOMINE HCL 20 MG
20 TABLET ORAL 4 TIMES DAILY PRN
Qty: 40 TABLET | Refills: 0 | Status: SHIPPED | OUTPATIENT
Start: 2021-09-01

## 2021-09-01 ASSESSMENT — ENCOUNTER SYMPTOMS
SORE THROAT: 0
NAUSEA: 0
COUGH: 0
DIARRHEA: 0
VOMITING: 0
SHORTNESS OF BREATH: 0
CONSTIPATION: 0

## 2021-09-01 ASSESSMENT — PAIN DESCRIPTION - DESCRIPTORS
DESCRIPTORS: SHARP
DESCRIPTORS: SHARP

## 2021-09-01 ASSESSMENT — PAIN DESCRIPTION - PAIN TYPE
TYPE: ACUTE PAIN
TYPE: ACUTE PAIN

## 2021-09-01 ASSESSMENT — PAIN DESCRIPTION - FREQUENCY
FREQUENCY: INTERMITTENT
FREQUENCY: CONTINUOUS

## 2021-09-01 ASSESSMENT — PAIN DESCRIPTION - LOCATION
LOCATION: ABDOMEN
LOCATION: ABDOMEN

## 2021-09-01 ASSESSMENT — PAIN DESCRIPTION - ORIENTATION: ORIENTATION: MID;LOWER

## 2021-09-01 NOTE — ED TRIAGE NOTES
Pt with sharp mid low abdominal pain. LBM was this am and \"normal\". Denies vomiting or diarrhea, no fever. Pt was at [facility] on Sun, had a CT scan and urine and everything was normal. Denies burning with urination.

## 2021-09-01 NOTE — ED NOTES
Pt pink, warm and dry, breathing with ease. Remains with intermittent abdominal pain. AVS reviewed including follow up appointments, diagnosis, and care of self at home. Denies questions or concerns. Pt remains alert and oriented. Pt discharged in stable condition, walking without difficulty.       Micki Moya RN  09/01/21 4042

## 2021-09-01 NOTE — ED NOTES
Pt sitting up in bed, watching TV, drinking her iced coffee. Spouse at side. Remains with intermittent abdominal pains. No further complaints.       Xiao Cadet RN  09/01/21 8081

## 2021-09-01 NOTE — ED PROVIDER NOTES
Skin:     General: Skin is warm and dry. Neurological:      General: No focal deficit present. Mental Status: She is alert and oriented to person, place, and time. Psychiatric:         Behavior: Behavior normal.          PAST MEDICAL HISTORY     has a past medical history of Anxiety, Depression, and Migraines. SURGICAL HISTORY     has a past surgical history that includes Tubal ligation; Tear duct surgery; and skin biopsy. CURRENT MEDICATIONS    Discharge Medication List as of 9/1/2021  6:03 PM      CONTINUE these medications which have NOT CHANGED    Details   fluticasone (FLONASE) 50 MCG/ACT nasal spray 1 spray by Each Nostril route daily, Disp-1 Bottle, R-0Normal      amitriptyline (ELAVIL) 25 MG tablet Take 25 mg by mouth nightlyHistorical Med      venlafaxine (EFFEXOR XR) 150 MG extended release capsule Take 150 mg by mouth dailyHistorical Med      topiramate (TOPAMAX) 50 MG tablet Take 50 mg by mouth 2 times dailyHistorical Med      indomethacin (INDOCIN) 50 MG capsule Take 1 capsule by mouth 2 times daily as needed for Pain (Take with food.), Disp-20 capsule, R-0Normal      Pseudoephedrine-guaiFENesin (MUCINEX D MAX STRENGTH) 120-1200 MG TB12 Take 1 tablet by mouth 2 times daily as needed (cough/congestion), Disp-20 tablet,R-0Normal             ALLERGIES    is allergic to latex. FAMILY HISTORY    She indicated that her mother is alive. She indicated that her father is alive. family history includes Diabetes in her mother; High Blood Pressure in her father and mother; Other in her mother. SOCIAL HISTORY     reports that she has been smoking. She has been smoking about 0.50 packs per day. She has never used smokeless tobacco. She reports current alcohol use. She reports that she does not use drugs.     Review of systems     INITIAL VITALS: /67   Pulse 99   Temp 98 °F (36.7 °C) (Temporal)   Resp 16   Ht 5' 7\" (1.702 m)   Wt 243 lb (110.2 kg)   LMP 08/21/2021   SpO2 97%   BMI 38.06 kg/m²      Review of Systems   Constitutional: Negative for fever and malaise/fatigue. HENT: Negative for congestion and sore throat. Respiratory: Negative for cough and shortness of breath. Cardiovascular: Negative for palpitations. Gastrointestinal: Negative for constipation, diarrhea, nausea and vomiting. Genitourinary: Negative for dysuria and flank pain. Neurological: Negative for dizziness, focal weakness and headaches. All other systems reviewed and are negative. LABS:     Labs Reviewed   CBC WITH AUTO DIFFERENTIAL - Abnormal; Notable for the following components:       Result Value    MCHC 32.7 (*)     MPV 6.9 (*)     All other components within normal limits   COMPREHENSIVE METABOLIC PANEL - Abnormal; Notable for the following components:    Glucose 112 (*)     All other components within normal limits   PREGNANCY, URINE   URINE RT REFLEX TO CULTURE   LIPASE   GLOMERULAR FILTRATION RATE, ESTIMATED   ANION GAP       Vitals:    Vitals:    09/01/21 1615   BP: 119/67   Pulse: 99   Resp: 16   Temp: 98 °F (36.7 °C)   TempSrc: Temporal   SpO2: 97%   Weight: 243 lb (110.2 kg)   Height: 5' 7\" (1.702 m)       EMERGENCY DEPARTMENT COURSE:    Lab results and plan of care discussed with the patient. She does had a normal CT done a few days ago, so no imaging was repeated. On further questioning, she recently ran out of her pantoprazole. She has had some GI issues in the past.  She is planning to follow-up with a gastroenterologist.  This seems like a good idea. Meanwhile, I will place her back on pantoprazole and give her dicyclomine as needed for cramping. FINAL IMPRESSION      1.  Generalized abdominal pain        DISPOSITION/PLAN    DISPOSITION Decision To Discharge 09/01/2021 05:59:25 PM      PATIENT REFERRED TO:    Marshall Hall, LISSET - SHE [address]  [facility] Crenshaw [address] [place] [address] Drive  [phone]    Schedule an appointment as soon as possible for a visit       your GI specialist as scheduled.             DISCHARGE MEDICATIONS:    Discharge Medication List as of 9/1/2021  6:03 PM             (Please note that portions of this note were completed with a voice recognition program.  Efforts were made to edit the dictations but occasionally words are mis-transcribed.)      Alycia Shultz MD  09/01/21 2013

## 2021-09-01 NOTE — ED NOTES
Records from [facility] ED obtained as pt was seen there on 8/29/21.       Mandy Morse, ANTOINETTE  09/01/21 7691

## 2022-01-26 ENCOUNTER — HOSPITAL ENCOUNTER (EMERGENCY)
Age: 37
Discharge: HOME | End: 2022-01-26
Payer: COMMERCIAL

## 2022-01-26 VITALS
OXYGEN SATURATION: 96 % | TEMPERATURE: 97.2 F | BODY MASS INDEX: 39.37 KG/M2 | HEIGHT: 66 IN | SYSTOLIC BLOOD PRESSURE: 146 MMHG | HEART RATE: 112 BPM | DIASTOLIC BLOOD PRESSURE: 102 MMHG | WEIGHT: 245 LBS | RESPIRATION RATE: 12 BRPM

## 2022-01-26 DIAGNOSIS — Z20.822 ENCOUNTER FOR LABORATORY TESTING FOR COVID-19 VIRUS: ICD-10-CM

## 2022-01-26 DIAGNOSIS — J06.9 VIRAL URI WITH COUGH: Primary | ICD-10-CM

## 2022-01-26 LAB — SARS-COV-2, NAA: NOT  DETECTED

## 2022-01-26 PROCEDURE — 99213 OFFICE O/P EST LOW 20 MIN: CPT | Performed by: NURSE PRACTITIONER

## 2022-01-26 PROCEDURE — 99213 OFFICE O/P EST LOW 20 MIN: CPT

## 2022-01-26 PROCEDURE — 87635 SARS-COV-2 COVID-19 AMP PRB: CPT

## 2022-01-26 RX ORDER — GUAIFENESIN AND PSEUDOEPHEDRINE HCL 1200; 120 MG/1; MG/1
1 TABLET, EXTENDED RELEASE ORAL 2 TIMES DAILY PRN
Qty: 20 TABLET | Refills: 0 | COMMUNITY
Start: 2022-01-26

## 2022-01-26 RX ORDER — FLUTICASONE PROPIONATE 50 MCG
1 SPRAY, SUSPENSION (ML) NASAL DAILY
Qty: 1 EACH | Refills: 0 | Status: SHIPPED | OUTPATIENT
Start: 2022-01-26 | End: 2022-09-30 | Stop reason: SDUPTHER

## 2022-01-26 ASSESSMENT — ENCOUNTER SYMPTOMS
COUGH: 1
NAUSEA: 0
SORE THROAT: 1
SHORTNESS OF BREATH: 0

## 2022-01-26 ASSESSMENT — PAIN DESCRIPTION - LOCATION: LOCATION: FACE

## 2022-01-26 ASSESSMENT — PAIN SCALES - GENERAL: PAINLEVEL_OUTOF10: 7

## 2022-01-26 NOTE — ED PROVIDER NOTES
[facility] Encounter       CHIEF COMPLAINT       Chief Complaint   Patient presents with    Cough     sore throa sinus pressure       Nurses Notes reviewed and I agree except as noted in the HPI. HISTORY OF PRESENT ILLNESS   Kenzie Leos is a 43 y.o. female who presents with complaints of a cough, sore throat, and sinus pressure. Her symptoms have been present for the past few days. This is a new problem. She is requesting COVID-19 testing. She denies any known exposure to illness. She has been taking Mucinex D without any relief. She denies any fever, chills, shortness of breath, or chest pain. The history is provided by the patient. REVIEW OF SYSTEMS     Review of Systems   Constitutional: Negative for chills and fever. HENT: Positive for congestion and sore throat. Respiratory: Positive for cough. Negative for shortness of breath. Cardiovascular: Negative for chest pain. Gastrointestinal: Negative for nausea. Musculoskeletal: Negative for myalgias. Neurological: Positive for headaches. PAST MEDICAL HISTORY         Diagnosis Date    Anxiety     Depression     Migraines        SURGICALHISTORY     Patient  has a past surgical history that includes Tubal ligation; Tear duct surgery; skin biopsy; and Cholecystectomy.     CURRENT MEDICATIONS       Previous Medications    AMITRIPTYLINE (ELAVIL) 25 MG TABLET    Take 25 mg by mouth nightly    DICYCLOMINE (BENTYL) 20 MG TABLET    Take 1 tablet by mouth 4 times daily as needed (abdominal pain)    INDOMETHACIN (INDOCIN) 50 MG CAPSULE    Take 1 capsule by mouth 2 times daily as needed for Pain (Take with food.)    PANTOPRAZOLE (PROTONIX) 40 MG TABLET    Take 1 tablet by mouth every morning (before breakfast)    TOPIRAMATE (TOPAMAX) 50 MG TABLET    Take 50 mg by mouth 2 times daily    VENLAFAXINE (EFFEXOR XR) 150 MG EXTENDED RELEASE CAPSULE    Take 150 mg by mouth daily       ALLERGIES Patient is is allergic to latex. Patients   Immunization History   Administered Date(s) Administered    Tdap (Boostrix, Adacel) 11/06/2020       FAMILY HISTORY     Patient's family history includes Diabetes in her mother; High Blood Pressure in her father and mother; Other in her mother. SOCIAL HISTORY     Patient  reports that she has been smoking e-cigarettes and cigarettes. She has been smoking about 0.50 packs per day. She has never used smokeless tobacco. She reports current alcohol use. She reports that she does not use drugs. PHYSICAL EXAM     ED TRIAGE VITALS  BP: (!) 146/102, Temp: 97.2 °F (36.2 °C), Pulse: 112, Resp: 12, SpO2: 96 %,Estimated body mass index is 39.54 kg/m² as calculated from the following:    Height as of this encounter: 5' 6\" (1.676 m). Weight as of this encounter: 245 lb (111.1 kg). ,No LMP recorded. Physical Exam  Vitals and nursing note reviewed. Constitutional:       General: She is not in acute distress. Appearance: She is not ill-appearing. HENT:      Right Ear: Tympanic membrane, ear canal and external ear normal.      Left Ear: Tympanic membrane, ear canal and external ear normal.      Nose: Congestion present. No rhinorrhea. Mouth/Throat:      Mouth: Mucous membranes are moist.      Pharynx: Posterior oropharyngeal erythema present. No oropharyngeal exudate. Cardiovascular:      Rate and Rhythm: Regular rhythm. Tachycardia present. Heart sounds: Normal heart sounds. Pulmonary:      Effort: Pulmonary effort is normal.      Breath sounds: Normal breath sounds. No wheezing or rales. Skin:     General: Skin is warm and dry. Neurological:      Mental Status: She is alert and oriented to person, place, and time.        DIAGNOSTIC RESULTS     Labs:  Results for orders placed or performed during the hospital encounter of 01/26/22   COVID-19, Rapid   Result Value Ref Range    SARS-CoV-2, SOFIA NOT  DETECTED NOT DETECTED IMAGING:  none    EKG:  none    URGENT CARE COURSE:     Vitals:    01/26/22 1421   BP: (!) 146/102   Pulse: 112   Resp: 12   Temp: 97.2 °F (36.2 °C)   SpO2: 96%   Weight: 245 lb (111.1 kg)   Height: 5' 6\" (1.676 m)       Medications - No data to display       PROCEDURES:  None    FINAL IMPRESSION      1. Viral URI with cough    2. Encounter for laboratory testing for COVID-19 virus      DISPOSITION/ PLAN   DISPOSITION Decision To Discharge 01/26/2022 03:00:33 PM     Rapid COVID-19 test negative for infection. Exam consistent with a viral upper respiratory infection with cough. Recommend over-the-counter medications for treatment and control symptoms. May take over-the-counter antipyretics as necessary. Encourage oral fluid intake and get plenty rest.  Follow-up with PCP if symptoms persist or fail to improve. Present to the ER for worsening condition.     PATIENT REFERRED TO:  LISSET Marshall CNP  [address] [address] / [facility] [address]      DISCHARGE MEDICATIONS:  New Prescriptions    DYCLONINE-GLYCERIN (CEPACOL SORE THROAT SPRAY) 0.1-33 % LIQD    Take 1 spray by mouth every 2 hours as needed (sore throat)       Discontinued Medications    No medications on file       Current Discharge Medication List      CONTINUE these medications which have CHANGED    Details   fluticasone (FLONASE) 50 MCG/ACT nasal spray 1 spray by Each Nostril route daily  Qty: 1 each, Refills: 0      pseudoephedrine-guaiFENesin (MUCINEX D MAX STRENGTH) 120-1200 MG TB12 Take 1 tablet by mouth 2 times daily as needed (cough/congestion)  Qty: 20 tablet, Refills: 0             LISSET Waggoner CNP    (Please note that portions of this note were completed with a voice recognition program. Efforts were made to edit the dictations but occasionally words are mis-transcribed.)            LISSET Waggoner CNP  01/26/22 4676

## 2022-01-26 NOTE — Clinical Note
Pj Graves was seen and treated in our emergency department on 1/26/2022. She may return to work on 01/27/2022. If you have any questions or concerns, please don't hesitate to call.       Emil Banda, LISSET - CNP

## 2022-06-15 ENCOUNTER — HOSPITAL ENCOUNTER (EMERGENCY)
Age: 37
Discharge: HOME | End: 2022-06-15
Attending: EMERGENCY MEDICINE
Payer: COMMERCIAL

## 2022-06-15 ENCOUNTER — APPOINTMENT (OUTPATIENT)
Dept: GENERAL RADIOLOGY | Age: 37
End: 2022-06-15
Payer: COMMERCIAL

## 2022-06-15 VITALS
BODY MASS INDEX: 40.18 KG/M2 | SYSTOLIC BLOOD PRESSURE: 183 MMHG | HEART RATE: 98 BPM | HEIGHT: 66 IN | WEIGHT: 250 LBS | TEMPERATURE: 97.7 F | DIASTOLIC BLOOD PRESSURE: 77 MMHG | RESPIRATION RATE: 16 BRPM | OXYGEN SATURATION: 96 %

## 2022-06-15 DIAGNOSIS — R03.0 ELEVATED BLOOD PRESSURE READING: ICD-10-CM
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
## 2022-06-15 DIAGNOSIS — F17.200 TOBACCO USE DISORDER: ICD-10-CM

## 2022-06-15 DIAGNOSIS — M77.51 TENDINITIS OF RIGHT FOOT: ICD-10-CM

## 2022-06-15 DIAGNOSIS — L03.115 CELLULITIS OF RIGHT FOOT: ICD-10-CM

## 2022-06-15 PROCEDURE — 99213 OFFICE O/P EST LOW 20 MIN: CPT

## 2022-06-15 PROCEDURE — 99214 OFFICE O/P EST MOD 30 MIN: CPT | Performed by: EMERGENCY MEDICINE

## 2022-06-15 PROCEDURE — 73630 X-RAY EXAM OF FOOT: CPT

## 2022-06-15 RX ORDER — NAPROXEN 500 MG/1
500 TABLET ORAL 2 TIMES DAILY WITH MEALS
Qty: 20 TABLET | Refills: 0 | Status: SHIPPED | OUTPATIENT
Start: 2022-06-15 | End: 2022-06-24 | Stop reason: ALTCHOICE

## 2022-06-15 RX ORDER — CEPHALEXIN 500 MG/1
500 CAPSULE ORAL 3 TIMES DAILY
Qty: 21 CAPSULE | Refills: 0 | Status: SHIPPED | OUTPATIENT
Start: 2022-06-15 | End: 2022-06-22

## 2022-06-15 ASSESSMENT — ENCOUNTER SYMPTOMS
BLOOD IN STOOL: 0
TROUBLE SWALLOWING: 0
COUGH: 0
COLOR CHANGE: 1
CHOKING: 0
VOMITING: 0
FACIAL SWELLING: 0
EYE REDNESS: 0
DIARRHEA: 0
SHORTNESS OF BREATH: 0
STRIDOR: 0
ABDOMINAL PAIN: 0
EYE PAIN: 0
SINUS PRESSURE: 0
SORE THROAT: 0
EYE DISCHARGE: 0
VOICE CHANGE: 0
BACK PAIN: 0
NAUSEA: 0
CONSTIPATION: 0
WHEEZING: 0

## 2022-06-15 ASSESSMENT — PAIN DESCRIPTION - ONSET: ONSET: ON-GOING

## 2022-06-15 ASSESSMENT — PAIN DESCRIPTION - LOCATION: LOCATION: FOOT

## 2022-06-15 ASSESSMENT — PAIN DESCRIPTION - PAIN TYPE: TYPE: ACUTE PAIN

## 2022-06-15 ASSESSMENT — PAIN DESCRIPTION - ORIENTATION: ORIENTATION: RIGHT;ANTERIOR

## 2022-06-15 ASSESSMENT — PAIN SCALES - GENERAL: PAINLEVEL_OUTOF10: 9

## 2022-06-15 ASSESSMENT — PAIN DESCRIPTION - DESCRIPTORS: DESCRIPTORS: SHARP;TENDER

## 2022-06-15 ASSESSMENT — PAIN DESCRIPTION - FREQUENCY: FREQUENCY: CONTINUOUS

## 2022-06-15 ASSESSMENT — PAIN - FUNCTIONAL ASSESSMENT: PAIN_FUNCTIONAL_ASSESSMENT: 0-10

## 2022-06-15 NOTE — ED NOTES
Pt presents to [facility] with c/o right anterior foot pain that has been occurring for several weeks per pt.  Pt denies any known injury     Arin Plummer LPN  98/70/76 0229

## 2022-06-15 NOTE — Clinical Note
Maggi Juarez was seen and treated in our emergency department on 6/15/2022. She may return to work on 06/17/2022. No work [place] 15  and June 16, 2022     If you have any questions or concerns, please don't hesitate to call.       Keo Olguin MD

## 2022-06-15 NOTE — ED PROVIDER NOTES
[phone]       Chief Complaint   Patient presents with    Foot Pain     right       Nurses Notes reviewed and I agree except as noted in the HPI. HISTORY OF PRESENT ILLNESS   Serafin Lord is a 42 y.o. female who presents with 2-day history of right foot pain. She rates pain at 9/10 in severity. No injury or fall. She has redness and swelling, with a cat scratch on the dorsum of her right foot. No fever, vomiting, motor or sensory deficits, deformity, bruising. No history of diabetes or MRSA. Smokes cigarettes. S/P BLTL. UTD tetanus  REVIEW OF SYSTEMS     Review of Systems   Constitutional: Negative for appetite change, chills, fatigue, fever and unexpected weight change. No fever normal appetite   HENT: Negative for congestion, ear discharge, ear pain, facial swelling, hearing loss, nosebleeds, postnasal drip, sinus pressure, sore throat, trouble swallowing and voice change. No upper respiratory symptoms   Eyes: Negative for pain, discharge, redness and visual disturbance. No redness or drainage   Respiratory: Negative for cough, choking, shortness of breath, wheezing and stridor. No cough or shortness of breath   Cardiovascular: Negative for chest pain and leg swelling. No chest pain or syncope   Gastrointestinal: Negative for abdominal pain, blood in stool, constipation, diarrhea, nausea and vomiting. No abdominal pain or vomiting   Genitourinary: Negative for dysuria, flank pain, frequency, hematuria, urgency, vaginal bleeding and vaginal discharge. Musculoskeletal: Negative for arthralgias, back pain, neck pain and neck stiffness. Skin: Positive for color change and wound. Negative for rash. Abrasion redness swelling tenderness dorsum of right foot. She received cat scratch   Neurological: Negative for dizziness, seizures, syncope, weakness, light-headedness and headaches. No headache or lethargy   Hematological: Negative for adenopathy. Does not bruise/bleed easily. Psychiatric/Behavioral: Negative for confusion, sleep disturbance and suicidal ideas. The patient is not nervous/anxious. red and bold elements reviewed    PAST MEDICAL HISTORY         Diagnosis Date    Anxiety     Depression     Migraines        SURGICAL HISTORY     Patient  has a past surgical history that includes Tubal ligation; Tear duct surgery; skin biopsy; and Cholecystectomy. CURRENT MEDICATIONS       Discharge Medication List as of 6/15/2022 11:10 AM      CONTINUE these medications which have NOT CHANGED    Details   Dyclonine-Glycerin (CEPACOL SORE THROAT SPRAY) 0.1-33 % LIQD Take 1 spray by mouth every 2 hours as needed (sore throat), R-0OTC      fluticasone (FLONASE) 50 MCG/ACT nasal spray 1 spray by Each Nostril route daily, Disp-1 each, R-0Normal      pseudoephedrine-guaiFENesin (MUCINEX D MAX STRENGTH) 120-1200 MG TB12 Take 1 tablet by mouth 2 times daily as needed (cough/congestion), Disp-20 tablet, R-0OTC      pantoprazole (PROTONIX) 40 MG tablet Take 1 tablet by mouth every morning (before breakfast), Disp-30 tablet, R-0Normal      dicyclomine (BENTYL) 20 MG tablet Take 1 tablet by mouth 4 times daily as needed (abdominal pain), Disp-40 tablet, R-0Normal      indomethacin (INDOCIN) 50 MG capsule Take 1 capsule by mouth 2 times daily as needed for Pain (Take with food.), Disp-20 capsule, R-0Normal      amitriptyline (ELAVIL) 25 MG tablet Take 25 mg by mouth nightlyHistorical Med      venlafaxine (EFFEXOR XR) 150 MG extended release capsule Take 150 mg by mouth dailyHistorical Med      topiramate (TOPAMAX) 50 MG tablet Take 50 mg by mouth 2 times dailyHistorical Med             ALLERGIES     Patient is is allergic to latex. FAMILY HISTORY     Patient'sfamily history includes Diabetes in her mother; High Blood Pressure in her father and mother; Other in her mother.     SOCIAL HISTORY Patient  reports that she has been smoking e-cigarettes and cigarettes. She has been smoking about 0.50 packs per day. She has never used smokeless tobacco. She reports previous alcohol use. She reports that she does not use drugs. PHYSICAL EXAM     ED TRIAGE VITALS  BP: (!) 183/77, Temp: 97.7 °F (36.5 °C), Heart Rate: 98, Resp: 16, SpO2: 96 %  Physical Exam  Vitals and nursing note reviewed. Constitutional:       General: She is not in acute distress. Appearance: She is well-developed. She is not ill-appearing. Comments: Moist membranes, normal airway   HENT:      Head: Normocephalic and atraumatic. Right Ear: External ear normal.      Left Ear: External ear normal.      Nose: Nose normal.      Mouth/Throat:      Pharynx: No oropharyngeal exudate. Comments: Oropharynx normal  Eyes:      General: No scleral icterus. Right eye: No discharge. Left eye: No discharge. Extraocular Movements:      Right eye: Normal extraocular motion. Left eye: Normal extraocular motion. Conjunctiva/sclera: Conjunctivae normal.      Pupils: Pupils are equal, round, and reactive to light. Comments: Conjunctiva clear   Neck:      Thyroid: No thyromegaly. Vascular: No JVD. Comments: No meningismus  Cardiovascular:      Rate and Rhythm: Normal rate and regular rhythm. Pulses: Normal pulses. Heart sounds: Normal heart sounds, S1 normal and S2 normal. No murmur heard. No friction rub. No gallop. Comments: No murmur  Pulmonary:      Effort: Pulmonary effort is normal. No tachypnea or respiratory distress. Breath sounds: Normal breath sounds. No stridor. No decreased breath sounds, wheezing, rhonchi or rales. Comments: No cough lungs clear  Chest:      Chest wall: No tenderness. Abdominal:      General: Bowel sounds are normal. There is no distension. Palpations: Abdomen is soft. There is no mass. Tenderness:  There is no abdominal tenderness. There is no guarding or rebound. Musculoskeletal:         General: Normal range of motion. Cervical back: Normal range of motion. No spinous process tenderness or muscular tenderness. Right foot: Swelling and tenderness present. Left foot: Normal.      Comments: Tenderness over dorsum with erythema and abrasion. No abscess. Distal neurovascular intact. Lymphadenopathy:      Cervical: No cervical adenopathy. Right cervical: No superficial cervical adenopathy. Left cervical: No superficial cervical adenopathy. Skin:     General: Skin is warm and dry. Findings: No erythema or rash. Comments: Dorsum right foot-6 x 8 cm erythema and tenderness with warmth. Abrasion distally. No abscess. Neurological:      Mental Status: She is alert and oriented to person, place, and time. Cranial Nerves: No cranial nerve deficit. Motor: No abnormal muscle tone. Coordination: Coordination normal.      Deep Tendon Reflexes: Reflexes are normal and symmetric. Reflexes normal.      Comments: Appropriate, distal neurovascular intact right lower extremity   Psychiatric:         Behavior: Behavior normal.         Thought Content: Thought content normal.         Judgment: Judgment normal.         DIAGNOSTIC RESULTS   Labs: No results found for this visit on 06/15/22. IMAGING:  XR FOOT RIGHT (MIN 3 VIEWS)   Final Result   Negative right foot            **This report has been created using voice recognition software. It may contain minor errors which are inherent in voice recognition technology. **      Final report electronically signed by Dr. Miladys Nelson on 6/15/2022 10:52 AM        URGENT CARE COURSE:     Vitals:    06/15/22 1034   BP: (!) 183/77   Pulse: 98   Resp: 16   Temp: 97.7 °F (36.5 °C)   TempSrc: Temporal   SpO2: 96%   Weight: 250 lb (113.4 kg)   Height: 5' 6\" (1.676 m)       Medications - No data to display  PROCEDURES:  None  FINALIMPRESSION      1.  Cat scratch    2. Cellulitis of right foot    3. Tendinitis of right foot    4. Elevated blood pressure reading    5. Tobacco use disorder        DISPOSITION/PLAN   DISPOSITION    Nontoxic, well-hydrated, normal airway. No radiographic evidence of fracture, dislocation, foreign body, arthritis. Patient has cat scratch and cellulitis with soft tissue injury-tendinitis. Will treat with cephalexin, Bactroban ointment, Naprosyn, increased oral clear liquids, rest and elevation of the right lower extremity. Patient to follow-up with [facility] for recheck in 5 days, and she understands to go to ED if worse. Patient instructed to wear only supportive athletic shoes. In addition, patient understands importance of smoke cessation and was given written instructions to quit smoking  PATIENT REFERRED TO:  [facility], [address]  [phone]  Schedule an appointment as soon as possible for a visit in 5 days  Recheck if problems persist, go to emergency if worse    DISCHARGE MEDICATIONS:  Discharge Medication List as of 6/15/2022 11:10 AM      START taking these medications    Details   cephALEXin (KEFLEX) 500 MG capsule Take 1 capsule by mouth 3 times daily for 21 doses, Disp-21 capsule, R-0Print      mupirocin (BACTROBAN) 2 % ointment Apply topically 3 times daily. , Disp-22 g, R-0, Print      naproxen (NAPROSYN) 500 MG tablet Take 1 tablet by mouth 2 times daily (with meals) for 20 doses, Disp-20 tablet, R-0Print           Discharge Medication List as of 6/15/2022 11:10 AM          MD Fabiola Loev MD  06/15/22 7190

## 2022-06-24 ENCOUNTER — HOSPITAL ENCOUNTER (EMERGENCY)
Age: 37
Discharge: HOME | End: 2022-06-24
Attending: EMERGENCY MEDICINE
Payer: COMMERCIAL

## 2022-06-24 VITALS
SYSTOLIC BLOOD PRESSURE: 114 MMHG | RESPIRATION RATE: 16 BRPM | HEART RATE: 95 BPM | TEMPERATURE: 98.9 F | DIASTOLIC BLOOD PRESSURE: 59 MMHG | OXYGEN SATURATION: 96 %

## 2022-06-24 DIAGNOSIS — F17.200 TOBACCO USE DISORDER: ICD-10-CM

## 2022-06-24 DIAGNOSIS — M79.671 ACUTE FOOT PAIN, RIGHT: ICD-10-CM

## 2022-06-24 DIAGNOSIS — M77.51 TENDINITIS OF RIGHT FOOT: Primary | ICD-10-CM

## 2022-06-24 PROCEDURE — 99213 OFFICE O/P EST LOW 20 MIN: CPT | Performed by: EMERGENCY MEDICINE

## 2022-06-24 PROCEDURE — 99213 OFFICE O/P EST LOW 20 MIN: CPT

## 2022-06-24 RX ORDER — NAPROXEN 500 MG/1
500 TABLET ORAL 2 TIMES DAILY WITH MEALS
Qty: 20 TABLET | Refills: 0 | Status: SHIPPED | OUTPATIENT
Start: 2022-06-24 | End: 2022-07-04

## 2022-06-24 ASSESSMENT — ENCOUNTER SYMPTOMS
WHEEZING: 0
CONSTIPATION: 0
VOICE CHANGE: 0
NAUSEA: 0
EYE DISCHARGE: 0
COUGH: 0
STRIDOR: 0
SHORTNESS OF BREATH: 0
EYE REDNESS: 0
TROUBLE SWALLOWING: 0
FACIAL SWELLING: 0
VOMITING: 0
BACK PAIN: 0
DIARRHEA: 0
ABDOMINAL PAIN: 0
SORE THROAT: 0
SINUS PRESSURE: 0
CHOKING: 0
EYE PAIN: 0
BLOOD IN STOOL: 0

## 2022-06-24 ASSESSMENT — PAIN - FUNCTIONAL ASSESSMENT: PAIN_FUNCTIONAL_ASSESSMENT: 0-10

## 2022-06-24 ASSESSMENT — PAIN SCALES - GENERAL: PAINLEVEL_OUTOF10: 8

## 2022-06-24 ASSESSMENT — PAIN DESCRIPTION - ORIENTATION: ORIENTATION: RIGHT

## 2022-06-24 ASSESSMENT — PAIN DESCRIPTION - LOCATION: LOCATION: FOOT

## 2022-06-24 NOTE — ED PROVIDER NOTES
[phone]       Chief Complaint   Patient presents with    Foot Problem       Nurses Notes reviewed and I agree except as noted in the HPI. HISTORY OF PRESENT ILLNESS   Dayana Retana is a 42 y.o. female who presents with right foot pain that she rates at 8 out of 10 in severity. 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 She has some swelling of the dorsum of the right foot. Patient evaluated [facility] 6/15/2022, but did not comply with follow-up instructions. Patient has appointment July 12 for follow-up. No recurrent trauma, fever, vomiting, motor or sensory deficits, deformity. No history of diabetes or PAD. Status post BLTL. Smokes cigarettes  REVIEW OF SYSTEMS     Review of Systems   Constitutional: Negative for appetite change, chills, fatigue, fever and unexpected weight change. Normal appetite no fever   HENT: Negative for congestion, ear discharge, ear pain, facial swelling, hearing loss, nosebleeds, postnasal drip, sinus pressure, sore throat, trouble swallowing and voice change. No upper respiratory symptoms   Eyes: Negative for pain, discharge, redness and visual disturbance. No redness or drainage   Respiratory: Negative for cough, choking, shortness of breath, wheezing and stridor. No cough or shortness of breath   Cardiovascular: Negative for chest pain and leg swelling. No chest pain or syncope   Gastrointestinal: Negative for abdominal pain, blood in stool, constipation, diarrhea, nausea and vomiting. No abdominal pain or vomiting   Genitourinary: Negative for dysuria, flank pain, frequency, hematuria, urgency, vaginal bleeding and vaginal discharge. No possibility of pregnancy   Musculoskeletal: Negative for arthralgias, back pain, neck pain and neck stiffness. Right footPain and swelling right foot   Skin: Negative for rash.         No trauma to the skin   Neurological: Negative for dizziness, seizures, syncope, weakness, light-headedness and headaches. No headache or lethargy   Hematological: Negative for adenopathy. Does not bruise/bleed easily. Psychiatric/Behavioral: Negative for confusion, sleep disturbance and suicidal ideas. The patient is not nervous/anxious. PAST MEDICAL HISTORY         Diagnosis Date    Anxiety     Depression     Migraines        SURGICAL HISTORY     Patient  has a past surgical history that includes Tubal ligation; Tear duct surgery; skin biopsy; and Cholecystectomy. CURRENT MEDICATIONS       Discharge Medication List as of 6/24/2022  3:01 PM      CONTINUE these medications which have NOT CHANGED    Details   Dyclonine-Glycerin (CEPACOL SORE THROAT SPRAY) 0.1-33 % LIQD Take 1 spray by mouth every 2 hours as needed (sore throat), R-0OTC      fluticasone (FLONASE) 50 MCG/ACT nasal spray 1 spray by Each Nostril route daily, Disp-1 each, R-0Normal      pseudoephedrine-guaiFENesin (MUCINEX D MAX STRENGTH) 120-1200 MG TB12 Take 1 tablet by mouth 2 times daily as needed (cough/congestion), Disp-20 tablet, R-0OTC      pantoprazole (PROTONIX) 40 MG tablet Take 1 tablet by mouth every morning (before breakfast), Disp-30 tablet, R-0Normal      dicyclomine (BENTYL) 20 MG tablet Take 1 tablet by mouth 4 times daily as needed (abdominal pain), Disp-40 tablet, R-0Normal      indomethacin (INDOCIN) 50 MG capsule Take 1 capsule by mouth 2 times daily as needed for Pain (Take with food.), Disp-20 capsule, R-0Normal      amitriptyline (ELAVIL) 25 MG tablet Take 25 mg by mouth nightlyHistorical Med      venlafaxine (EFFEXOR XR) 150 MG extended release capsule Take 150 mg by mouth dailyHistorical Med      topiramate (TOPAMAX) 50 MG tablet Take 50 mg by mouth 2 times dailyHistorical Med             ALLERGIES     Patient is is allergic to latex.     FAMILY HISTORY     Patient'sfamily history includes Diabetes in her mother; High Blood Pressure in her father and mother; Other in her mother. SOCIAL HISTORY     Patient  reports that she has been smoking e-cigarettes and cigarettes. She has been smoking about 0.50 packs per day. She has never used smokeless tobacco. She reports previous alcohol use. She reports that she does not use drugs. PHYSICAL EXAM     ED TRIAGE VITALS  BP: (!) 114/59, Temp: 98.9 °F (37.2 °C), Heart Rate: 95, Resp: 16, SpO2: 96 %  Physical Exam  Vitals and nursing note reviewed. Constitutional:       General: She is not in acute distress. Appearance: She is well-developed. She is not ill-appearing. Comments: Moist membranes   HENT:      Head: Normocephalic and atraumatic. Right Ear: External ear normal.      Left Ear: External ear normal.      Nose: Nose normal.      Mouth/Throat:      Pharynx: No oropharyngeal exudate. Comments: Oropharynx normal  Eyes:      General: No scleral icterus. Right eye: No discharge. Left eye: No discharge. Extraocular Movements:      Right eye: Normal extraocular motion. Left eye: Normal extraocular motion. Conjunctiva/sclera: Conjunctivae normal.      Pupils: Pupils are equal, round, and reactive to light. Comments: Conjunctiva clear   Neck:      Thyroid: No thyromegaly. Vascular: No JVD. Comments: No meningismus  Cardiovascular:      Rate and Rhythm: Normal rate and regular rhythm. Pulses: Normal pulses. Heart sounds: Normal heart sounds, S1 normal and S2 normal. No murmur heard. No friction rub. No gallop. Comments: No murmur  Pulmonary:      Effort: Pulmonary effort is normal. No tachypnea or respiratory distress. Breath sounds: Normal breath sounds. No stridor. No decreased breath sounds, wheezing, rhonchi or rales. Comments: No cough lungs clear  Chest:      Chest wall: No tenderness. Abdominal:      General: Bowel sounds are normal. There is no distension.       Palpations: Abdomen is soft. There is no mass. Tenderness: There is no abdominal tenderness. There is no guarding or rebound. Musculoskeletal:         General: Normal range of motion. Cervical back: Normal range of motion. No torticollis. No spinous process tenderness or muscular tenderness. Right foot: Swelling and tenderness present. Left foot: Normal.      Comments: Tenderness over the extensor tendons dorsum right foot. Minimal swelling. No cellulitis. Distal neurovascular intact. Lymphadenopathy:      Cervical: No cervical adenopathy. Right cervical: No superficial or deep cervical adenopathy. Left cervical: No superficial or deep cervical adenopathy. Skin:     General: Skin is warm and dry. Findings: No erythema or rash. Comments: Skin intact no persistent cellulitis   Neurological:      Mental Status: She is alert and oriented to person, place, and time. Cranial Nerves: No cranial nerve deficit. Motor: No abnormal muscle tone. Coordination: Coordination normal.      Deep Tendon Reflexes: Reflexes are normal and symmetric. Reflexes normal.      Comments: Appropriate, no focal findings. Distal neurovascular intact right lower extremity   Psychiatric:         Behavior: Behavior normal.         Thought Content: Thought content normal.         Judgment: Judgment normal.         DIAGNOSTIC RESULTS   Labs: No results found for this visit on 06/24/22. IMAGING:  No orders to display     URGENT CARE COURSE:     Vitals:    06/24/22 1443   BP: (!) 114/59   Pulse: 95   Resp: 16   Temp: 98.9 °F (37.2 °C)   TempSrc: Infrared   SpO2: 96%       Medications - No data to display  PROCEDURES:  None  FINALIMPRESSION      1. Tendinitis of right foot    2. Acute foot pain, right    3. Tobacco use disorder        DISPOSITION/PLAN   DISPOSITION Decision To Discharge 06/24/2022 02:57:48 PM  Nontoxic, well-hydrated, normal airway. No indication for repeat radiographs.   Patient has strain and sprain of right foot with tendinitis. Doubt stress fracture but this is a possibility. Will treat with Naprosyn, heat, rest, elevation of right lower extremity. Patient instructed to wear only supportive athletic shoes. Patient to follow-up with [facility] in 3 days for reevaluation. She understands to go to ED if worse. [address] [place], [place] [phone]  Schedule an appointment as soon as possible for a visit in 3 days  Recheck [facility] ASAP.   Go to emergency if worse    DISCHARGE MEDICATIONS:  Discharge Medication List as of 6/24/2022  3:01 PM      START taking these medications    Details   naproxen (NAPROSYN) 500 MG tablet Take 1 tablet by mouth 2 times daily (with meals) for 20 doses, Disp-20 tablet, R-0Print           Discharge Medication List as of 6/24/2022  3:01 PM          MD Cathleen Blank MD  06/24/22 7287

## 2022-06-24 NOTE — Clinical Note
Emil Clark was seen and treated in our emergency department on 6/24/2022. She may return to work on 06/26/2022. No work June 24 and June 25, 2022     If you have any questions or concerns, please don't hesitate to call.       Adalgisa Bauman MD

## 2022-06-24 NOTE — ED TRIAGE NOTES
Has appt with foot and ankle doctor on the 12th, but right foot/ankle pain has become worse , making it hard to walk at work , has been going on since last week

## 2022-07-28 ENCOUNTER — HOSPITAL ENCOUNTER (EMERGENCY)
Age: 37
Discharge: HOME | End: 2022-07-28
Payer: COMMERCIAL

## 2022-07-28 VITALS
TEMPERATURE: 98.3 F | SYSTOLIC BLOOD PRESSURE: 124 MMHG | HEART RATE: 78 BPM | OXYGEN SATURATION: 98 % | DIASTOLIC BLOOD PRESSURE: 70 MMHG | RESPIRATION RATE: 16 BRPM

## 2022-07-28 DIAGNOSIS — M79.671 CHRONIC FOOT PAIN, RIGHT: Primary | ICD-10-CM

## 2022-07-28 DIAGNOSIS — Z02.89 ENCOUNTER TO OBTAIN EXCUSE FROM WORK: ICD-10-CM

## 2022-07-28 DIAGNOSIS — G89.29 CHRONIC FOOT PAIN, RIGHT: Primary | ICD-10-CM

## 2022-07-28 PROCEDURE — 99213 OFFICE O/P EST LOW 20 MIN: CPT

## 2022-07-28 PROCEDURE — 99213 OFFICE O/P EST LOW 20 MIN: CPT | Performed by: NURSE PRACTITIONER

## 2022-07-28 NOTE — Clinical Note
Patricio Amaro was seen and treated in our emergency department on 7/28/2022. She may return to work on 07/30/2022. If you have any questions or concerns, please don't hesitate to call.       Shemar Coffman, LISSET - CNP

## 2022-07-28 NOTE — ED PROVIDER NOTES
[address] Encounter      [facility]       Chief Complaint   Patient presents with    Foot Pain    Letter for School/Work         Nurses Notes reviewed and I agree except as noted in the HPI. HISTORY OF PRESENT ILLNESS   Gisela Berumen is a 42 y.o. female who presents for evaluation. She states that she has been having right foot pain since June. She has had x-ray which is unremarkable she is scheduled to have an MRI at [facility] in August.  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 She is requesting a work note for the next 2 days. There has been no new injury or trauma. The patient/patient representative has no other acute complaints at this time. REVIEW OF SYSTEMS     Review of Systems   Musculoskeletal:  Positive for arthralgias. PAST MEDICAL HISTORY         Diagnosis Date    Anxiety     Depression     Migraines        SURGICAL HISTORY     Patient  has a past surgical history that includes Tubal ligation; Tear duct surgery; skin biopsy; and Cholecystectomy.     CURRENT MEDICATIONS       Previous Medications    AMITRIPTYLINE (ELAVIL) 25 MG TABLET    Take 25 mg by mouth nightly    DICYCLOMINE (BENTYL) 20 MG TABLET    Take 1 tablet by mouth 4 times daily as needed (abdominal pain)    DYCLONINE-GLYCERIN (CEPACOL SORE THROAT SPRAY) 0.1-33 % LIQD    Take 1 spray by mouth every 2 hours as needed (sore throat)    FLUTICASONE (FLONASE) 50 MCG/ACT NASAL SPRAY    1 spray by Each Nostril route daily    INDOMETHACIN (INDOCIN) 50 MG CAPSULE    Take 1 capsule by mouth 2 times daily as needed for Pain (Take with food.)    NAPROXEN (NAPROSYN) 500 MG TABLET    Take 1 tablet by mouth 2 times daily (with meals) for 20 doses    PANTOPRAZOLE (PROTONIX) 40 MG TABLET    Take 1 tablet by mouth every morning (before breakfast)    PSEUDOEPHEDRINE-GUAIFENESIN (MUCINEX D MAX STRENGTH) 120-1200 MG TB12    Take 1 tablet by mouth 2 times daily as needed (cough/congestion)    TOPIRAMATE (TOPAMAX) 50 MG TABLET    Take 50 mg by mouth 2 times daily    VENLAFAXINE (EFFEXOR XR) 150 MG EXTENDED RELEASE CAPSULE    Take 150 mg by mouth daily       ALLERGIES     Patient is is allergic to latex. FAMILY HISTORY     Patient'sfamily history includes Diabetes in her mother; High Blood Pressure in her father and mother; Other in her mother. SOCIAL HISTORY     Patient  reports that she has been smoking e-cigarettes and cigarettes. She has been smoking an average of .5 packs per day. She has never used smokeless tobacco. She reports that she does not currently use alcohol. She reports that she does not use drugs. PHYSICAL EXAM     ED TRIAGE VITALS  BP: 124/70, Temp: 98.3 °F (36.8 °C), Heart Rate: 78, Resp: 16, SpO2: 98 %  Physical Exam  Vitals and nursing note reviewed. Constitutional:       General: She is not in acute distress. Appearance: Normal appearance. She is well-developed and well-groomed. HENT:      Head: Normocephalic and atraumatic. Right Ear: External ear normal.      Left Ear: External ear normal.      Mouth/Throat:      Lips: Pink. Mouth: Mucous membranes are moist.   Eyes:      Conjunctiva/sclera: Conjunctivae normal.      Right eye: Right conjunctiva is not injected. Left eye: Left conjunctiva is not injected. Pupils: Pupils are equal.   Cardiovascular:      Rate and Rhythm: Normal rate. Pulmonary:      Effort: Pulmonary effort is normal. No respiratory distress. Musculoskeletal:      Cervical back: Normal range of motion. Feet:    Skin:     General: Skin is warm and dry. Findings: No rash (on exposed surfaces). Neurological:      Mental Status: She is alert and oriented to person, place, and time. Psychiatric:         Mood and Affect: Mood normal.         Speech: Speech normal.         Behavior: Behavior is cooperative.        DIAGNOSTIC RESULTS   Labs:  Abnormal Labs Reviewed - No data to display     IMAGING:  No orders to display     URGENT CARE COURSE:     Vitals:    07/28/22 1857   BP: 124/70   Pulse: 78   Resp: 16   Temp: 98.3 °F (36.8 °C)   TempSrc: Temporal   SpO2: 98%       Medications - No data to display  PROCEDURES:  FINALIMPRESSION      1. Chronic foot pain, right    2. Encounter to obtain excuse from work        DISPOSITION/PLAN   DISPOSITION Decision To Discharge 07/28/2022 06:58:19 PM    Follow with Celine Ramos in August as scheduled. Advised patient that we can only give 2 consecutive days off here in the urgent care. If she needs more time off from work due to this pain, she has been advised to discuss this with the providers at [facility], her family provider. ACE wrap provided. Problem List Items Addressed This Visit    None  Visit Diagnoses       Chronic foot pain, right    -  Primary    Encounter to obtain excuse from work                Physical assessment findings, diagnostic testing(s) if applicable, and vital signs reviewed with patient/patient representative. Differential diagnosis(s) discussed with patient/patient representative. Prescription medications and/or over-the-counter medications for symptom management discussed. Patient is to follow-up with family care provider in 2-3 days if no improvement. If symptoms should worsen or change, go to the ED. Patient/patient representative is aware of care plan, questions answered, verbalizes understanding and is in agreement. Printed instructions attached to after visit summary. If COVID-19 positive or COVID-19 by PCR is pending at time of discharge patient is to quarantine/isolate according to [employer] guidelines. PATIENT REFERRED TO:  LISSET Tavares - SHE Antony [address] [place] [address] [address] [facility]  [phone]    Schedule an appointment as soon as possible for a visit in 3 days  For further evaluation. , If symptoms change/worsen, go to the [address] [place] [address] SURGERY  [address] [address]  [phone]    as needed, For further evaluation and MRI as scheduled    LISSET Anne CNP    Please note that some or all of this chart was generated using [employer] voice recognition software. Although every effort was made to ensure the accuracy of this automated transcription, some errors in transcription may have occurred.         LISSET Anne CNP  07/28/22 9642

## 2022-07-28 NOTE — ED TRIAGE NOTES
Cheng Shankar arrives to room with complaint of  r foot pain  symptoms started several weeks ago. [facility] Shankar is set up for an mri in august a oio needs a work note.

## 2022-09-19 ENCOUNTER — HOSPITAL ENCOUNTER (EMERGENCY)
Age: 37
Discharge: HOME | End: 2022-09-19
Payer: COMMERCIAL

## 2022-09-19 VITALS
SYSTOLIC BLOOD PRESSURE: 158 MMHG | OXYGEN SATURATION: 96 % | DIASTOLIC BLOOD PRESSURE: 87 MMHG | RESPIRATION RATE: 16 BRPM | WEIGHT: 260 LBS | HEART RATE: 89 BPM | HEIGHT: 66 IN | TEMPERATURE: 97.6 F | BODY MASS INDEX: 41.78 KG/M2

## 2022-09-19 DIAGNOSIS — L23.9 ALLERGIC DERMATITIS: Primary | ICD-10-CM

## 2022-09-19 PROCEDURE — 96372 THER/PROPH/DIAG INJ SC/IM: CPT

## 2022-09-19 PROCEDURE — 6360000002 HC RX W HCPCS: Performed by: NURSE PRACTITIONER

## 2022-09-19 PROCEDURE — 99213 OFFICE O/P EST LOW 20 MIN: CPT

## 2022-09-19 PROCEDURE — 99213 OFFICE O/P EST LOW 20 MIN: CPT | Performed by: NURSE PRACTITIONER

## 2022-09-19 RX ORDER — METHYLPREDNISOLONE ACETATE 80 MG/ML
80 INJECTION, SUSPENSION INTRA-ARTICULAR; INTRALESIONAL; INTRAMUSCULAR; SOFT TISSUE ONCE
Status: COMPLETED | OUTPATIENT
Start: 2022-09-19 | End: 2022-09-19

## 2022-09-19 RX ADMIN — METHYLPREDNISOLONE ACETATE 80 MG: 80 INJECTION, SUSPENSION INTRA-ARTICULAR; INTRALESIONAL; INTRAMUSCULAR; SOFT TISSUE at 11:49

## 2022-09-19 ASSESSMENT — ENCOUNTER SYMPTOMS
WHEEZING: 0
TROUBLE SWALLOWING: 0
NAUSEA: 0
SHORTNESS OF BREATH: 0

## 2022-09-19 NOTE — ED PROVIDER NOTES
Psychiatric:         Mood and Affect: Mood normal.         Behavior: Behavior normal. Behavior is cooperative. DIAGNOSTIC RESULTS   Labs: No results found for this visit on 09/19/22. IMAGING:  No orders to display     URGENT CARE COURSE:     Vitals:    09/19/22 1135   BP: (!) 158/87   Pulse: 89   Resp: 16   Temp: 97.6 °F (36.4 °C)   TempSrc: Temporal   SpO2: 96%   Weight: 260 lb (117.9 kg)   Height: 5' 6\" (1.676 m)       Medications   methylPREDNISolone acetate (DEPO-MEDROL) injection 80 mg (80 mg IntraMUSCular Given 9/19/22 1149)     PROCEDURES:  None  FINALIMPRESSION      1. Allergic dermatitis        DISPOSITION/PLAN   DISPOSITION Decision To Discharge 09/19/2022 11:47:31 AM  Exam consistent with allergic dermatitis. No secondary infection. Patient given Depo-Medrol during visit. Over-the-counter treatment as needed. If symptoms worsen go to ER. PATIENT REFERRED TO:  Natalie Anthony APRN - CNP  Zein[address] Antony [address] [place] [address]  [phone]      Follow up as needed. HYDROCORTISONE CREAM OTC. Benadryl at bed time. Lotion for eczema 2x/day to face. If worse go to ER.     DISCHARGE MEDICATIONS:  New Prescriptions    No medications on file     Current Discharge Medication List          Tiffanie Ramirez, [address] [facility], APRN - CNP  09/19/22 0427

## 2022-09-19 NOTE — ED TRIAGE NOTES
Painful, itchy rash around mouth for past 2 weeks. States she is allergic to Latex and wore latex gloves 2 weeks ago as well. Pt states she changed facial soap at that time.

## 2022-09-30 ENCOUNTER — HOSPITAL ENCOUNTER (EMERGENCY)
Age: 37
Discharge: HOME | End: 2022-09-30
Payer: COMMERCIAL

## 2022-09-30 VITALS
HEART RATE: 98 BPM | SYSTOLIC BLOOD PRESSURE: 124 MMHG | DIASTOLIC BLOOD PRESSURE: 77 MMHG | RESPIRATION RATE: 16 BRPM | OXYGEN SATURATION: 98 % | TEMPERATURE: 98.3 F

## 2022-09-30 DIAGNOSIS — J01.90 ACUTE NON-RECURRENT SINUSITIS, UNSPECIFIED LOCATION: Primary | ICD-10-CM

## 2022-09-30 PROCEDURE — 99213 OFFICE O/P EST LOW 20 MIN: CPT | Performed by: EMERGENCY MEDICINE

## 2022-09-30 PROCEDURE — 99213 OFFICE O/P EST LOW 20 MIN: CPT

## 2022-09-30 RX ORDER — FLUTICASONE PROPIONATE 50 MCG
1 SPRAY, SUSPENSION (ML) NASAL DAILY
Qty: 1 EACH | Refills: 0 | Status: SHIPPED | OUTPATIENT
Start: 2022-09-30

## 2022-09-30 RX ORDER — LORATADINE AND PSEUDOEPHEDRINE SULFATE 10; 240 MG/1; MG/1
1 TABLET, EXTENDED RELEASE ORAL DAILY
Qty: 15 TABLET | Refills: 0 | Status: SHIPPED | OUTPATIENT
Start: 2022-09-30

## 2022-09-30 ASSESSMENT — ENCOUNTER SYMPTOMS
RHINORRHEA: 1
SINUS PRESSURE: 1
COUGH: 0
SORE THROAT: 0
SHORTNESS OF BREATH: 0

## 2022-09-30 NOTE — ED PROVIDER NOTES
[facility]  Urgent Care Encounter       CHIEF COMPLAINT       Chief Complaint   Patient presents with    Cough    Congestion       Nurses Notes reviewed and I agree except as noted in the HPI. HISTORY OF PRESENT ILLNESS   Dedra Pena is a 42 y.o. female who presents for sinus congestion, pressure, right-sided headache. Patient states symptoms been present for 6 to 7 days. She states she has been using over-the-counter Mucinex with no improvement of symptoms. He states she did have a low-grade fever. She has tested herself for COVID-19 and negative. Cough, chest pain or shortness of breath. No nausea, vomiting or diarrhea. Denies sore throat. HPI    REVIEW OF SYSTEMS     Review of Systems   Constitutional:  Negative for activity change, chills, fatigue and fever. HENT:  Positive for congestion, rhinorrhea and sinus pressure. Negative for sore throat and tinnitus. Respiratory:  Negative for cough and shortness of breath. Neurological:  Positive for dizziness and headaches. Psychiatric/Behavioral:  Negative for behavioral problems. PAST MEDICAL HISTORY         Diagnosis Date    Anxiety     Depression     Migraines        SURGICALHISTORY     Patient  has a past surgical history that includes Tubal ligation; Tear duct surgery; skin biopsy; and Cholecystectomy.     CURRENT MEDICATIONS       Discharge Medication List as of 9/30/2022  2:25 PM        CONTINUE these medications which have NOT CHANGED    Details   naproxen (NAPROSYN) 500 MG tablet Take 1 tablet by mouth 2 times daily (with meals) for 20 doses, Disp-20 tablet, R-0Print      Dyclonine-Glycerin (CEPACOL SORE THROAT SPRAY) 0.1-33 % LIQD Take 1 spray by mouth every 2 hours as needed (sore throat), R-0OTC      pseudoephedrine-guaiFENesin (MUCINEX D MAX STRENGTH) 120-1200 MG TB12 Take 1 tablet by mouth 2 times daily as needed (cough/congestion), Disp-20 tablet, R-0OTC      pantoprazole (PROTONIX) 40 MG tablet Take 1 tablet by mouth every morning (before breakfast), Disp-30 tablet, R-0Normal      dicyclomine (BENTYL) 20 MG tablet Take 1 tablet by mouth 4 times daily as needed (abdominal pain), Disp-40 tablet, R-0Normal      indomethacin (INDOCIN) 50 MG capsule Take 1 capsule by mouth 2 times daily as needed for Pain (Take with food.), Disp-20 capsule, R-0Normal      amitriptyline (ELAVIL) 25 MG tablet Take 25 mg by mouth nightlyHistorical Med      venlafaxine (EFFEXOR XR) 150 MG extended release capsule Take 150 mg by mouth dailyHistorical Med      topiramate (TOPAMAX) 50 MG tablet Take 50 mg by mouth 2 times dailyHistorical Med             ALLERGIES     Patient is is allergic to latex. Patients   Immunization History   Administered Date(s) Administered    Tdap (Boostrix, Adacel) 11/06/2020       FAMILY HISTORY     Patient's family history includes Diabetes in her mother; High Blood Pressure in her father and mother; Other in her mother. SOCIAL HISTORY     Patient  reports that she has been smoking e-cigarettes and cigarettes. She has been smoking an average of .5 packs per day. She has never used smokeless tobacco. She reports that she does not currently use alcohol. She reports that she does not use drugs. PHYSICAL EXAM     ED TRIAGE VITALS  BP: 124/77, Temp: 98.3 °F (36.8 °C), Heart Rate: 98, Resp: 16, SpO2: 98 %,Estimated body mass index is 41.97 kg/m² as calculated from the following:    Height as of 9/19/22: 5' 6\" (1.676 m). Weight as of 9/19/22: 260 lb (117.9 kg). ,No LMP recorded. Physical Exam  Constitutional:       Appearance: She is normal weight. HENT:      Head: Normocephalic. Nose: Congestion and rhinorrhea present. Right Sinus: Maxillary sinus tenderness and frontal sinus tenderness present. Left Sinus: Maxillary sinus tenderness and frontal sinus tenderness present. Mouth/Throat:      Mouth: Mucous membranes are moist.      Pharynx: Oropharynx is clear.  No oropharyngeal exudate. Cardiovascular:      Rate and Rhythm: Normal rate. Pulmonary:      Effort: Pulmonary effort is normal.   Skin:     General: Skin is warm and dry. Capillary Refill: Capillary refill takes less than 2 seconds. Neurological:      General: No focal deficit present. Mental Status: She is alert. Psychiatric:         Mood and Affect: Mood normal.         Behavior: Behavior normal.       DIAGNOSTIC RESULTS     Labs:No results found for this visit on 09/30/22. IMAGING:    No orders to display         EKG:      URGENT CARE COURSE:     Vitals:    09/30/22 1403   BP: 124/77   Pulse: 98   Resp: 16   Temp: 98.3 °F (36.8 °C)   TempSrc: Temporal   SpO2: 98%       Medications - No data to display         PROCEDURES:  None    FINAL IMPRESSION      1. Acute non-recurrent sinusitis, unspecified location          DISPOSITION/ PLAN     Patient presents for what is likely viral sinusitis. Symptoms have been present for 6 to 7 days. Will place patient on Flonase, Claritin-D and advised to drink plenty of fluids for treatment of symptoms. Tylenol/ibuprofen as needed for headache. Advise she should follow-up with her primary care provider or return here if symptoms not improved in 3 to 4 days as she may require an antibiotic if symptoms do not improve in this timeframe.       PATIENT REFERRED TO:  LISSET Perea - SHE Antony [address] [facility] [address] / [place] [place] [id number]      DISCHARGE MEDICATIONS:  Discharge Medication List as of 9/30/2022  2:25 PM        START taking these medications    Details   loratadine-pseudoephedrine (CLARITIN-D 24 HOUR)  MG per extended release tablet Take 1 tablet by mouth daily, Disp-15 tablet, R-0Normal             Discharge Medication List as of 9/30/2022  2:25 PM          Discharge Medication List as of 9/30/2022  2:25 PM        CONTINUE these medications which have CHANGED    Details   fluticasone (FLONASE) 50 MCG/ACT nasal spray 1 spray by Each Nostril route daily, Disp-1 each, R-0Normal             LISSET Bustos Cera, CNP    (Please note that portions of this note were completed with a voice recognition program. Efforts were made to edit the dictations but occasionally words are mis-transcribed.)           LISSET Bustos Cera, CNP  09/30/22 5738

## 2022-09-30 NOTE — DISCHARGE INSTRUCTIONS
Drink plenty of water    Medications as directed    Tylenol/ibuprofen as needed for headache    Return as needed

## 2022-09-30 NOTE — Clinical Note
Mariah Blood was seen and treated in our emergency department on 9/30/2022. She may return to work on 10/02/2022. If you have any questions or concerns, please don't hesitate to call.       LISSET Ho - CNP

## 2022-11-29 ENCOUNTER — HOSPITAL ENCOUNTER (EMERGENCY)
Age: 37
Discharge: HOME | End: 2022-11-29
Attending: EMERGENCY MEDICINE
Payer: COMMERCIAL

## 2022-11-29 ENCOUNTER — APPOINTMENT (OUTPATIENT)
Dept: GENERAL RADIOLOGY | Age: 37
End: 2022-11-29
Payer: COMMERCIAL

## 2022-11-29 VITALS
TEMPERATURE: 97.2 F | OXYGEN SATURATION: 97 % | SYSTOLIC BLOOD PRESSURE: 129 MMHG | DIASTOLIC BLOOD PRESSURE: 91 MMHG | RESPIRATION RATE: 20 BRPM | HEART RATE: 87 BPM

## 2022-11-29 DIAGNOSIS — S60.222A CONTUSION OF LEFT HAND, INITIAL ENCOUNTER: Primary | ICD-10-CM

## 2022-11-29 PROCEDURE — 73140 X-RAY EXAM OF FINGER(S): CPT

## 2022-11-29 PROCEDURE — 99283 EMERGENCY DEPT VISIT LOW MDM: CPT

## 2022-11-29 PROCEDURE — 6370000000 HC RX 637 (ALT 250 FOR IP): Performed by: EMERGENCY MEDICINE

## 2022-11-29 RX ORDER — IBUPROFEN 800 MG/1
800 TABLET ORAL ONCE
Status: COMPLETED | OUTPATIENT
Start: 2022-11-29 | End: 2022-11-29

## 2022-11-29 RX ADMIN — IBUPROFEN 800 MG: 800 TABLET, FILM COATED ORAL at 10:51

## 2022-11-29 NOTE — ED NOTES
Discharge teaching and instructions for condition explained to patient. AVS reviewed. Patient voiced understanding regarding follow up appointments and care of self at home. Pt discharged to home in stable condition per self.        Ailin Rosario RN  11/29/22 6455

## 2022-11-29 NOTE — ED PROVIDER NOTES
[address]  [address] [place] [address] [address]  Phone: [phone]    eMERGENCY dEPARTMENT eNCOUnter           [address] [facility]       Chief Complaint   Patient presents with    Hand Injury     4th left finger       Nurses Notes reviewed and I agree except as noted in the HPI. HISTORY OF PRESENT ILLNESS    Nelson Leary is a 42 y.o. female who presented via private vehicle with above-mentioned complaint. She said that she injured her left hand at work this morning. Stated that she left hand was smashed with a heavy metal part. Is complaining of moderate, sharp, intermittent pain involving the left ring finger on the fourth metacarpal of the left hand. Pain is worse with head movement. She denies focal weakness or numbness. REVIEW OF SYSTEMS     Negative except as mentioned above    PAST MEDICAL HISTORY    has a past medical history of Anxiety, Depression, and Migraines. SURGICAL HISTORY      has a past surgical history that includes Tubal ligation; Tear duct surgery; skin biopsy; and Cholecystectomy.     CURRENT MEDICATIONS       Previous Medications    AMITRIPTYLINE (ELAVIL) 25 MG TABLET    Take 25 mg by mouth nightly    DICYCLOMINE (BENTYL) 20 MG TABLET    Take 1 tablet by mouth 4 times daily as needed (abdominal pain)    DYCLONINE-GLYCERIN (CEPACOL SORE THROAT SPRAY) 0.1-33 % LIQD    Take 1 spray by mouth every 2 hours as needed (sore throat)    FLUTICASONE (FLONASE) 50 MCG/ACT NASAL SPRAY    1 spray by Each Nostril route daily    INDOMETHACIN (INDOCIN) 50 MG CAPSULE    Take 1 capsule by mouth 2 times daily as needed for Pain (Take with food.)    LORATADINE-PSEUDOEPHEDRINE (CLARITIN-D 24 HOUR)  MG PER EXTENDED RELEASE TABLET    Take 1 tablet by mouth daily    PANTOPRAZOLE (PROTONIX) 40 MG TABLET    Take 1 tablet by mouth every morning (before breakfast)    PSEUDOEPHEDRINE-GUAIFENESIN (MUCINEX D MAX STRENGTH) 120-1200 MG TB12    Take 1 tablet by mouth 2 times daily as needed (cough/congestion)    TOPIRAMATE (TOPAMAX) 50 MG TABLET    Take 50 mg by mouth 2 times daily    VENLAFAXINE (EFFEXOR XR) 150 MG EXTENDED RELEASE CAPSULE    Take 150 mg by mouth daily       ALLERGIES     is allergic to latex. FAMILY HISTORY     She indicated that her mother is alive. She indicated that her father is alive. family history includes Diabetes in her mother; High Blood Pressure in her father and mother; Other in her mother. SOCIAL HISTORY      reports that she has been smoking e-cigarettes and cigarettes. She has been smoking an average of .5 packs per day. She has never used smokeless tobacco. She reports that she does not currently use alcohol. She reports that she does not use drugs. PHYSICAL EXAM     INITIAL VITALS:  temporal temperature is 97.2 °F (36.2 °C). Her blood pressure is 129/91 (abnormal) and her pulse is 87. Her respiration is 20 and oxygen saturation is 97%. Physical Exam  Vitals and nursing note reviewed. Constitutional:       General: She is in acute distress. Appearance: Normal appearance. HENT:      Head: Atraumatic. Cardiovascular:      Pulses: Normal pulses. Musculoskeletal:      Comments: Examination of the left hand showed mild swelling, ecchymosis and tenderness of the dorsal aspect of the PIP joint of the fourth finger. There is tiny bloody blister just proximal to the nail fold. She has normal neurovascular semination of the left hand and wrist.   Neurological:      Mental Status: She is alert. DIFFERENTIAL DIAGNOSIS:       DIAGNOSTIC RESULTS       RADIOLOGY:   Left hand x-ray showed no fracture or dislocation. LABS:   Labs Reviewed - No data to display    EMERGENCY DEPARTMENT COURSE:   Vitals:    Vitals:    11/29/22 1039   BP: (!) 129/91   Pulse: 87   Resp: 20   Temp: 97.2 °F (36.2 °C)   TempSrc: Temporal   SpO2: 97%   MDM:  Patient presented with left hand contusion, x-ray was normal.  She was given ibuprofen p.o.   She was given 3-day light duty. Discussed diagnosis and treatment plan with her. FINAL IMPRESSION      1. Contusion of left hand, initial encounter          DISPOSITION/PLAN   Discharged home in good condition.     PATIENT REFERRED TO:  [address]  [facility]  [address] [id number] [phone]  In 3 days      DISCHARGE MEDICATIONS:  New Prescriptions    No medications on file       (Please note that portions of this note were completed with a voice recognition program.  Efforts were made to edit the dictations but occasionally words are mis-transcribed.)    MD Jeronimo Shah MD  11/29/22 0406

## 2022-11-29 NOTE — ED NOTES
Message left at [facility], [facility] for clarification of drug screen and BAT needs.        Dandy Harvey RN  11/29/22 9641

## 2022-11-29 NOTE — DISCHARGE INSTRUCTIONS
Please do not work with the left hand for 3 days  Please take Motrin 800 g OTC every 8 hours as needed for pain  Please return in 3 days for recheck

## 2022-11-29 NOTE — ED NOTES
Spoke with Ruben Holt, she advised that she would get ahold of the patient and have the needed testing completed.      Darci Stauffer RN  11/29/22 9623

## 2022-11-29 NOTE — ED NOTES
Alumna foam splint applied. [employer] never returned call. Patient states she attempted to call to see if she needs a drug screen or BAT but was unable to get ahold of them as well.      Basil Gonzales RN  11/29/22 4309

## 2022-11-29 NOTE — ED NOTES
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 Neuro/circ status is intact to the left hand. Limited range of motion due to increased discomfort. Patient is alert and cooperative. Skin warm and dry. Color normal for ethnicity.      Enzo Menendez RN  11/29/22 1444

## 2022-12-04 ENCOUNTER — HOSPITAL ENCOUNTER (EMERGENCY)
Age: 37
Discharge: HOME | End: 2022-12-04
Payer: COMMERCIAL

## 2022-12-04 VITALS
HEIGHT: 66 IN | RESPIRATION RATE: 16 BRPM | WEIGHT: 246 LBS | HEART RATE: 83 BPM | TEMPERATURE: 97.6 F | DIASTOLIC BLOOD PRESSURE: 71 MMHG | SYSTOLIC BLOOD PRESSURE: 135 MMHG | BODY MASS INDEX: 39.53 KG/M2 | OXYGEN SATURATION: 97 %

## 2022-12-04 DIAGNOSIS — J10.1 INFLUENZA A: Primary | ICD-10-CM

## 2022-12-04 LAB
FLU A ANTIGEN: POSITIVE
FLU B ANTIGEN: NEGATIVE
SARS-COV-2, NAA: NOT  DETECTED

## 2022-12-04 PROCEDURE — 87635 SARS-COV-2 COVID-19 AMP PRB: CPT

## 2022-12-04 PROCEDURE — 87804 INFLUENZA ASSAY W/OPTIC: CPT

## 2022-12-04 PROCEDURE — 99213 OFFICE O/P EST LOW 20 MIN: CPT

## 2022-12-04 PROCEDURE — 99213 OFFICE O/P EST LOW 20 MIN: CPT | Performed by: NURSE PRACTITIONER

## 2022-12-04 RX ORDER — PREDNISONE 20 MG/1
20 TABLET ORAL DAILY
Qty: 5 TABLET | Refills: 0 | Status: SHIPPED | OUTPATIENT
Start: 2022-12-04 | End: 2022-12-09

## 2022-12-04 RX ORDER — ALBUTEROL SULFATE 90 UG/1
2 AEROSOL, METERED RESPIRATORY (INHALATION) 4 TIMES DAILY PRN
Qty: 54 G | Refills: 0 | Status: SHIPPED | OUTPATIENT
Start: 2022-12-04

## 2022-12-04 RX ORDER — BENZONATATE 200 MG/1
200 CAPSULE ORAL 3 TIMES DAILY PRN
Qty: 30 CAPSULE | Refills: 0 | Status: SHIPPED | OUTPATIENT
Start: 2022-12-04 | End: 2022-12-11

## 2022-12-04 ASSESSMENT — PAIN SCALES - GENERAL: PAINLEVEL_OUTOF10: 10

## 2022-12-04 ASSESSMENT — ENCOUNTER SYMPTOMS
SHORTNESS OF BREATH: 0
COUGH: 1
SORE THROAT: 0
CHEST TIGHTNESS: 1

## 2022-12-04 ASSESSMENT — PAIN DESCRIPTION - PAIN TYPE: TYPE: ACUTE PAIN

## 2022-12-04 ASSESSMENT — PAIN - FUNCTIONAL ASSESSMENT: PAIN_FUNCTIONAL_ASSESSMENT: 0-10

## 2022-12-04 ASSESSMENT — PAIN DESCRIPTION - LOCATION: LOCATION: CHEST

## 2022-12-04 ASSESSMENT — PAIN DESCRIPTION - ORIENTATION: ORIENTATION: MID

## 2022-12-04 ASSESSMENT — PAIN DESCRIPTION - DESCRIPTORS: DESCRIPTORS: SHARP

## 2022-12-04 NOTE — Clinical Note
Alexander Chase was seen and treated in our emergency department on 12/4/2022. She may return to work on 12/06/2022. If you have any questions or concerns, please don't hesitate to call.       Kemal Doyle, APRN - CNP

## 2022-12-04 NOTE — ED PROVIDER NOTES
[facility]  Urgent Care Encounter       CHIEF COMPLAINT       Chief Complaint   Patient presents with    Cough     Onset 12/1/22    Pleurisy     C/O chest pain with cough, deep breathing and palpation       Nurses Notes reviewed and I agree except as noted in the HPI. HISTORY OF PRESENT ILLNESS   Lauren Laureano is a 42 y.o. female who presents with complaints of a cough that started 3 days ago. She now has worsening chest pain with coughing and deep breathing. She complains of body aches and intermittent headaches as well. Has felt feverish but has not taken her temperature. Has been taking over-the-counter medications without any relief. The history is provided by the patient. REVIEW OF SYSTEMS     Review of Systems   Constitutional:  Positive for fatigue and fever. HENT:  Positive for congestion. Negative for sore throat. Respiratory:  Positive for cough and chest tightness. Negative for shortness of breath. Cardiovascular:  Positive for chest pain. Musculoskeletal:  Positive for myalgias. Neurological:  Positive for headaches. PAST MEDICAL HISTORY         Diagnosis Date    Anxiety     Depression     Migraines        SURGICALHISTORY     Patient  has a past surgical history that includes Tubal ligation; Tear duct surgery; skin biopsy; and Cholecystectomy.     CURRENT MEDICATIONS       Previous Medications    AMITRIPTYLINE (ELAVIL) 25 MG TABLET    Take 25 mg by mouth nightly    DICYCLOMINE (BENTYL) 20 MG TABLET    Take 1 tablet by mouth 4 times daily as needed (abdominal pain)    DYCLONINE-GLYCERIN (CEPACOL SORE THROAT SPRAY) 0.1-33 % LIQD    Take 1 spray by mouth every 2 hours as needed (sore throat)    FLUTICASONE (FLONASE) 50 MCG/ACT NASAL SPRAY    1 spray by Each Nostril route daily    INDOMETHACIN (INDOCIN) 50 MG CAPSULE    Take 1 capsule by mouth 2 times daily as needed for Pain (Take with food.)    LORATADINE-PSEUDOEPHEDRINE (CLARITIN-D 24 HOUR)  MG PER EXTENDED RELEASE TABLET    Take 1 tablet by mouth daily    PANTOPRAZOLE (PROTONIX) 40 MG TABLET    Take 1 tablet by mouth every morning (before breakfast)    PSEUDOEPHEDRINE-GUAIFENESIN (MUCINEX D MAX STRENGTH) 120-1200 MG TB12    Take 1 tablet by mouth 2 times daily as needed (cough/congestion)    TOPIRAMATE (TOPAMAX) 50 MG TABLET    Take 50 mg by mouth 2 times daily    VENLAFAXINE (EFFEXOR XR) 150 MG EXTENDED RELEASE CAPSULE    Take 150 mg by mouth daily       ALLERGIES     Patient is is allergic to latex. Patients   Immunization History   Administered Date(s) Administered    Tdap (Boostrix, Adacel) 11/06/2020       FAMILY HISTORY     Patient's family history includes Diabetes in her mother; High Blood Pressure in her father and mother; Other in her mother. SOCIAL HISTORY     Patient  reports that she has been smoking e-cigarettes and cigarettes. She has been smoking an average of .5 packs per day. She has never used smokeless tobacco. She reports current alcohol use. She reports that she does not use drugs. PHYSICAL EXAM     ED TRIAGE VITALS  BP: 135/71, Temp: 97.6 °F (36.4 °C), Heart Rate: 83, Resp: 16, SpO2: 97 %,Estimated body mass index is 39.71 kg/m² as calculated from the following:    Height as of this encounter: 5' 6\" (1.676 m). Weight as of this encounter: 246 lb (111.6 kg). ,Patient's last menstrual period was 11/27/2022. Physical Exam  Vitals and nursing note reviewed. Constitutional:       General: She is not in acute distress. Appearance: She is ill-appearing. She is not diaphoretic. HENT:      Right Ear: Tympanic membrane normal.      Left Ear: Tympanic membrane normal.      Nose: Congestion and rhinorrhea present. Mouth/Throat:      Mouth: Mucous membranes are moist.      Pharynx: No posterior oropharyngeal erythema. Cardiovascular:      Rate and Rhythm: Normal rate and regular rhythm. Heart sounds: Normal heart sounds.    Pulmonary:      Effort: Pulmonary effort is normal.      Breath sounds: Normal breath sounds. No wheezing, rhonchi or rales. Skin:     General: Skin is warm and dry. Neurological:      Mental Status: She is alert and oriented to person, place, and time. Motor: No weakness. Psychiatric:         Behavior: Behavior normal.       DIAGNOSTIC RESULTS     Labs:  Results for orders placed or performed during the hospital encounter of 12/04/22   COVID-19, Rapid   Result Value Ref Range    SARS-CoV-2, SOFIA NOT  DETECTED NOT DETECTED   Rapid influenza A/B antigens   Result Value Ref Range    Flu A Antigen Positive (A) NEGATIVE    Flu B Antigen Negative NEGATIVE       IMAGING:  None    EKG:  None    URGENT CARE COURSE:     Vitals:    12/04/22 1328   BP: 135/71   Pulse: 83   Resp: 16   Temp: 97.6 °F (36.4 °C)   TempSrc: Temporal   SpO2: 97%   Weight: 246 lb (111.6 kg)   Height: 5' 6\" (1.676 m)       Medications - No data to display       PROCEDURES:  None    FINAL IMPRESSION      1. Influenza A      DISPOSITION/ PLAN   DISPOSITION Decision To Discharge 12/04/2022 01:46:10 PM     Rapid COVID-19 test negative for infection. However, patient is positive for influenza A. We will start patient on albuterol, Tessalon Perles, and prednisone to help with symptoms. Discussed typical length of viral illnesses. Advised to stay at home until no fever and symptoms are improving.     PATIENT REFERRED TO:  LISSET Perea - CNP  [address] [address] / [facility] [id number]      DISCHARGE MEDICATIONS:  New Prescriptions    ALBUTEROL SULFATE HFA (VENTOLIN HFA) 108 (90 BASE) MCG/ACT INHALER    Inhale 2 puffs into the lungs 4 times daily as needed for Wheezing    BENZONATATE (TESSALON) 200 MG CAPSULE    Take 1 capsule by mouth 3 times daily as needed for Cough    PREDNISONE (DELTASONE) 20 MG TABLET    Take 1 tablet by mouth daily for 5 days       Discontinued Medications    No medications on file       Current Discharge Medication List          Homa Garcia Kaley Ravi - CNP    (Please note that portions of this note were completed with a voice recognition program. Efforts were made to edit the dictations but occasionally words are mis-transcribed.)           LISSET Quan - CNP  12/04/22 9104

## 2022-12-04 NOTE — ED NOTES
Discharge instructions and prescriptions reviewed with pt. Pt verbalized understanding. Pt ambulated out in stable condition. Assessment unchanged upon discharge.      Trae Rios RN  12/04/22 4847

## 2022-12-04 NOTE — ED NOTES
Rapid covid and flu swab obtained and sent to lab. Pt tolerated.       Aldair Harper RN  12/04/22 6817

## 2024-05-04 ENCOUNTER — HOSPITAL ENCOUNTER (EMERGENCY)
Age: 39
Discharge: HOME | End: 2024-05-04
Payer: COMMERCIAL

## 2024-05-04 VITALS
RESPIRATION RATE: 20 BRPM | SYSTOLIC BLOOD PRESSURE: 127 MMHG | OXYGEN SATURATION: 99 % | TEMPERATURE: 98 F | WEIGHT: 245 LBS | HEART RATE: 90 BPM | DIASTOLIC BLOOD PRESSURE: 89 MMHG | BODY MASS INDEX: 39.54 KG/M2

## 2024-05-04 DIAGNOSIS — N39.0 ACUTE UTI: ICD-10-CM

## 2024-05-04 DIAGNOSIS — B37.31 VAGINAL CANDIDIASIS: Primary | ICD-10-CM

## 2024-05-04 LAB
BILIRUB UR STRIP.AUTO-MCNC: NEGATIVE MG/DL
CHARACTER UR: CLEAR
COLOR: YELLOW
GLUCOSE UR QL STRIP.AUTO: NEGATIVE MG/DL
KETONES UR QL STRIP.AUTO: NEGATIVE
NITRITE UR QL STRIP.AUTO: NEGATIVE
PH UR STRIP.AUTO: 7.5 [PH] (ref 5–9)
PROT UR STRIP.AUTO-MCNC: NEGATIVE MG/DL
RBC #/AREA URNS HPF: NEGATIVE /[HPF]
SP GR UR STRIP.AUTO: 1.02 (ref 1–1.03)
UROBILINOGEN, URINE: 0.2 EU/DL (ref 0.2–1)
WBC #/AREA URNS HPF: ABNORMAL /[HPF]

## 2024-05-04 PROCEDURE — 99213 OFFICE O/P EST LOW 20 MIN: CPT

## 2024-05-04 PROCEDURE — 81003 URINALYSIS AUTO W/O SCOPE: CPT

## 2024-05-04 PROCEDURE — 87070 CULTURE OTHR SPECIMN AEROBIC: CPT

## 2024-05-04 PROCEDURE — 87205 SMEAR GRAM STAIN: CPT

## 2024-05-04 PROCEDURE — 87086 URINE CULTURE/COLONY COUNT: CPT

## 2024-05-04 RX ORDER — CEPHALEXIN 500 MG/1
500 CAPSULE ORAL 2 TIMES DAILY
Qty: 10 CAPSULE | Refills: 0 | Status: SHIPPED | OUTPATIENT
Start: 2024-05-04 | End: 2024-05-09

## 2024-05-04 RX ORDER — FLUCONAZOLE 150 MG/1
150 TABLET ORAL DAILY
Qty: 7 TABLET | Refills: 0 | Status: SHIPPED | OUTPATIENT
Start: 2024-05-04 | End: 2024-05-11

## 2024-05-04 ASSESSMENT — ENCOUNTER SYMPTOMS
EYE DISCHARGE: 0
ABDOMINAL PAIN: 0
NAUSEA: 0
WHEEZING: 0
TROUBLE SWALLOWING: 0
EYE REDNESS: 0
COUGH: 0
ALLERGIC/IMMUNOLOGIC NEGATIVE: 1
EYE PAIN: 0
SHORTNESS OF BREATH: 0
BACK PAIN: 0
DIARRHEA: 0
CONSTIPATION: 0
VOMITING: 0
SORE THROAT: 0
RHINORRHEA: 0

## 2024-05-04 ASSESSMENT — PAIN - FUNCTIONAL ASSESSMENT: PAIN_FUNCTIONAL_ASSESSMENT: NONE - DENIES PAIN

## 2024-05-04 NOTE — ED PROVIDER NOTES
[facility] URGENT CARE  Urgent Care Encounter      CHIEF COMPLAINT       Chief Complaint   Patient presents with    Vaginal Discharge     yellow       Nurses Notes reviewed and I agree except as noted in the HPI.  HISTORY OF PRESENT ILLNESS   Latosha Whipple is a 38 y.o. female who presents reports a 2-week vaginal yeast infection that is not responding to 1 week of over-the-counter vaginal suppository medication.  She states this is consistent with previous yeast infections.  Also may have a UTI due to some mild dysuria.    REVIEW OF SYSTEMS     Review of Systems   Constitutional:  Negative for activity change, fatigue and fever.   HENT:  Negative for congestion, ear pain, rhinorrhea, sore throat and trouble swallowing.    Eyes:  Negative for pain, discharge and redness.   Respiratory:  Negative for cough, shortness of breath and wheezing.    Cardiovascular: Negative.    Gastrointestinal:  Negative for abdominal pain, constipation, diarrhea, nausea and vomiting.   Endocrine: Negative.    Genitourinary:  Positive for dysuria, urgency and vaginal discharge. Negative for frequency and pelvic pain.   Musculoskeletal:  Negative for arthralgias, back pain and myalgias.   Skin:  Negative for rash.   Allergic/Immunologic: Negative.    Neurological:  Negative for dizziness, tremors, weakness and headaches.   Hematological: Negative.    Psychiatric/Behavioral:  Negative for dysphoric mood and sleep disturbance. The patient is not nervous/anxious.        PAST MEDICAL HISTORY         Diagnosis Date    Anxiety     Depression     Migraines        SURGICAL HISTORY     Patient  has a past surgical history that includes Tubal ligation; Tear duct surgery; skin biopsy; and Cholecystectomy.    CURRENT MEDICATIONS       Previous Medications    ALBUTEROL SULFATE HFA (VENTOLIN HFA) 108 (90 BASE) MCG/ACT INHALER    Inhale 2 puffs into the lungs 4 times daily as needed for Wheezing    AMITRIPTYLINE (ELAVIL) 25 MG TABLET     Take 25 mg by mouth nightly    DICYCLOMINE (BENTYL) 20 MG TABLET    Take 1 tablet by mouth 4 times daily as needed (abdominal pain)    DYCLONINE-GLYCERIN (CEPACOL SORE THROAT SPRAY) 0.1-33 % LIQD    Take 1 spray by mouth every 2 hours as needed (sore throat)    FLUTICASONE (FLONASE) 50 MCG/ACT NASAL SPRAY    1 spray by Each Nostril route daily    INDOMETHACIN (INDOCIN) 50 MG CAPSULE    Take 1 capsule by mouth 2 times daily as needed for Pain (Take with food.)    LORATADINE-PSEUDOEPHEDRINE (CLARITIN-D 24 HOUR)  MG PER EXTENDED RELEASE TABLET    Take 1 tablet by mouth daily    PANTOPRAZOLE (PROTONIX) 40 MG TABLET    Take 1 tablet by mouth every morning (before breakfast)    PSEUDOEPHEDRINE-GUAIFENESIN (MUCINEX D MAX STRENGTH) 120-1200 MG TB12    Take 1 tablet by mouth 2 times daily as needed (cough/congestion)    TOPIRAMATE (TOPAMAX) 50 MG TABLET    Take 50 mg by mouth 2 times daily    VENLAFAXINE (EFFEXOR XR) 150 MG EXTENDED RELEASE CAPSULE    Take 150 mg by mouth daily       ALLERGIES     Patient is is allergic to latex.    FAMILY HISTORY     Patient'sfamily history includes Diabetes in her mother; High Blood Pressure in her father and mother; Other in her mother.    SOCIAL HISTORY     Patient  reports that she has been smoking e-cigarettes and cigarettes. She has never used smokeless tobacco. She reports current alcohol use. She reports that she does not use drugs.    PHYSICAL EXAM     ED TRIAGE VITALS  BP: 127/89, Temp: 98 °F (36.7 °C), Pulse: 90, Respirations: 20, SpO2: 99 %  Physical Exam  Vitals and nursing note reviewed.   Constitutional:       General: She is not in acute distress.     Appearance: She is well-developed. She is not ill-appearing or diaphoretic.   HENT:      Right Ear: External ear normal.      Left Ear: External ear normal.      Nose: Nose normal.   Eyes:      General:         Right eye: No discharge.         Left eye: No discharge.      Conjunctiva/sclera: Conjunctivae normal.

## 2024-05-04 NOTE — ED TRIAGE NOTES
Patient to room c/o yellow vaginal drainage, intermittent pain with urination, and bilateral lower abdominal cramping beginning two weeks ago. States completion of Monistat 7, without improvement. Urine and vaginal swab obtained. Denies exposure to STI.

## 2024-05-05 LAB
BACTERIA GENITAL AEROBE CULT: NORMAL
BACTERIA UR CULT: ABNORMAL
GRAM STN GENITAL: NORMAL
ORGANISM: ABNORMAL

## 2024-05-07 ENCOUNTER — TELEPHONE (OUTPATIENT)
Dept: URGENT CARE | Age: 39
End: 2024-05-07

## 2024-05-07 DIAGNOSIS — N76.0 BACTERIAL VAGINOSIS: Primary | ICD-10-CM

## 2024-05-07 DIAGNOSIS — B96.89 BACTERIAL VAGINOSIS: Primary | ICD-10-CM

## 2024-05-07 LAB
BACTERIA GENITAL AEROBE CULT: NORMAL
GRAM STN GENITAL: NORMAL

## 2024-05-07 RX ORDER — METRONIDAZOLE 500 MG/1
500 TABLET ORAL 2 TIMES DAILY
Qty: 14 TABLET | Refills: 0 | Status: SHIPPED | OUTPATIENT
Start: 2024-05-07 | End: 2024-05-14

## 2024-05-07 NOTE — TELEPHONE ENCOUNTER
Genital culture showing bacterial vaginosis.  Take Flagyl as directed.  Follow-up with PCP or GYN in 1 week.

## 2024-09-29 ENCOUNTER — HOSPITAL ENCOUNTER (EMERGENCY)
Age: 39
Discharge: HOME | End: 2024-09-29
Payer: COMMERCIAL

## 2024-09-29 VITALS
TEMPERATURE: 98.6 F | SYSTOLIC BLOOD PRESSURE: 119 MMHG | OXYGEN SATURATION: 96 % | HEART RATE: 98 BPM | DIASTOLIC BLOOD PRESSURE: 84 MMHG | RESPIRATION RATE: 16 BRPM

## 2024-09-29 DIAGNOSIS — N30.00 ACUTE CYSTITIS WITHOUT HEMATURIA: Primary | ICD-10-CM

## 2024-09-29 LAB
BACTERIA: ABNORMAL
BILIRUB UR QL STRIP: ABNORMAL
CASTS #/AREA URNS LPF: ABNORMAL /LPF
CASTS #/AREA URNS LPF: ABNORMAL /LPF
CHARACTER UR: ABNORMAL
CHARCOAL URNS QL MICRO: ABNORMAL
COLOR UR: ABNORMAL
CRYSTALS URNS QL MICRO: ABNORMAL
EPITHELIAL CELLS, UA: ABNORMAL /HPF
GLUCOSE UR QL STRIP.AUTO: NEGATIVE MG/DL
HGB UR QL STRIP.AUTO: NEGATIVE
KETONES UR QL STRIP.AUTO: NEGATIVE
LEUKOCYTE ESTERASE UR QL STRIP.AUTO: ABNORMAL
NITRITE UR QL STRIP.AUTO: ABNORMAL
PH UR STRIP.AUTO: 5 [PH] (ref 5–9)
PROT UR STRIP.AUTO-MCNC: NEGATIVE MG/DL
RBC #/AREA URNS HPF: ABNORMAL /HPF
RENAL EPI CELLS #/AREA URNS HPF: ABNORMAL /[HPF]
SP GR UR REFRACT.AUTO: 1.02 (ref 1–1.03)
UROBILINOGEN UR QL STRIP.AUTO: 1 EU/DL (ref 0–1)
WBC #/AREA URNS HPF: ABNORMAL /HPF
YEAST LIKE FUNGI URNS QL MICRO: ABNORMAL

## 2024-09-29 PROCEDURE — 87086 URINE CULTURE/COLONY COUNT: CPT

## 2024-09-29 PROCEDURE — 99213 OFFICE O/P EST LOW 20 MIN: CPT

## 2024-09-29 PROCEDURE — 81001 URINALYSIS AUTO W/SCOPE: CPT

## 2024-09-29 PROCEDURE — 99214 OFFICE O/P EST MOD 30 MIN: CPT

## 2024-09-29 PROCEDURE — 87205 SMEAR GRAM STAIN: CPT

## 2024-09-29 PROCEDURE — 87070 CULTURE OTHR SPECIMN AEROBIC: CPT

## 2024-09-29 RX ORDER — NITROFURANTOIN 25; 75 MG/1; MG/1
100 CAPSULE ORAL 2 TIMES DAILY
Qty: 10 CAPSULE | Refills: 0 | Status: SHIPPED | OUTPATIENT
Start: 2024-09-29 | End: 2024-10-04

## 2024-09-29 RX ORDER — DULOXETIN HYDROCHLORIDE 20 MG/1
CAPSULE, DELAYED RELEASE ORAL
COMMUNITY
Start: 2024-09-23

## 2024-09-29 ASSESSMENT — PAIN DESCRIPTION - FREQUENCY: FREQUENCY: INTERMITTENT

## 2024-09-29 ASSESSMENT — PAIN - FUNCTIONAL ASSESSMENT
PAIN_FUNCTIONAL_ASSESSMENT: ACTIVITIES ARE NOT PREVENTED
PAIN_FUNCTIONAL_ASSESSMENT: 0-10

## 2024-09-29 ASSESSMENT — PAIN DESCRIPTION - LOCATION: LOCATION: OTHER (COMMENT)

## 2024-09-29 ASSESSMENT — PAIN DESCRIPTION - PAIN TYPE: TYPE: ACUTE PAIN

## 2024-09-29 ASSESSMENT — PAIN DESCRIPTION - DESCRIPTORS: DESCRIPTORS: SHARP

## 2024-09-29 ASSESSMENT — PAIN DESCRIPTION - ONSET: ONSET: GRADUAL

## 2024-09-29 ASSESSMENT — PAIN SCALES - GENERAL: PAINLEVEL_OUTOF10: 7

## 2024-09-29 NOTE — ED PROVIDER NOTES
[facility] URGENT CARE  Urgent Care Encounter       CHIEF COMPLAINT       Chief Complaint   Patient presents with    Dysuria     Scraping feeling of bladder with urination     Vaginal Discharge     Milky thick vaginal discharge        Nurses Notes reviewed and I agree except as noted in the HPI.  HISTORY OF PRESENT ILLNESS   Latosha Whipple is a 39 y.o. female who presents to urgent care for dysuria, cloudy urine and lower abdominal pain.  Patient states symptoms started 2 days ago.  Has taken over-the-counter Azo.  Patient also states that she has diarrhea.  Denies fever.  States diarrhea is normal for her ever since her gallbladder was removed.    The history is provided by the patient. No  was used.       REVIEW OF SYSTEMS     Review of Systems   Constitutional: Negative.  Negative for fever.   HENT: Negative.     Eyes: Negative.    Respiratory: Negative.     Gastrointestinal:  Positive for abdominal pain.   Endocrine: Negative.    Genitourinary:  Positive for dysuria and frequency.   Musculoskeletal: Negative.    Skin: Negative.    Allergic/Immunologic: Negative.    Neurological: Negative.    Hematological: Negative.    Psychiatric/Behavioral: Negative.         PAST MEDICAL HISTORY         Diagnosis Date    Anxiety     Depression     Migraines        SURGICALHISTORY     Patient  has a past surgical history that includes Tubal ligation; Tear duct surgery; skin biopsy; and Cholecystectomy.    CURRENT MEDICATIONS       Previous Medications    ALBUTEROL SULFATE HFA (VENTOLIN HFA) 108 (90 BASE) MCG/ACT INHALER    Inhale 2 puffs into the lungs 4 times daily as needed for Wheezing    AMITRIPTYLINE (ELAVIL) 25 MG TABLET    Take 1 tablet by mouth nightly    DICYCLOMINE (BENTYL) 20 MG TABLET    Take 1 tablet by mouth 4 times daily as needed (abdominal pain)    DULOXETINE (CYMBALTA) 20 MG EXTENDED RELEASE CAPSULE        DYCLONINE-GLYCERIN (CEPACOL SORE THROAT SPRAY) 0.1-33 % LIQD    Take

## 2024-09-29 NOTE — DISCHARGE INSTRUCTIONS
Medications as prescribed.  Increase fluid intake.  Can continue over-the-counter Pyridium.  Can take over-the-counter Motrin or Tylenol as needed.  Follow-up with family doctor in 3 to 5 days.  Go to ER for any increased pain or any new or worsening symptoms.

## 2024-09-30 LAB
BACTERIA UR CULT: ABNORMAL
ORGANISM: ABNORMAL

## 2024-10-02 LAB
BACTERIA GENITAL AEROBE CULT: ABNORMAL
BACTERIA GENITAL AEROBE CULT: ABNORMAL
GRAM STN GENITAL: ABNORMAL
ORGANISM: ABNORMAL

## 2024-11-04 ENCOUNTER — HOSPITAL ENCOUNTER (EMERGENCY)
Age: 39
Discharge: HOME | End: 2024-11-04
Payer: COMMERCIAL

## 2024-11-04 VITALS
TEMPERATURE: 97.3 F | HEART RATE: 102 BPM | SYSTOLIC BLOOD PRESSURE: 125 MMHG | OXYGEN SATURATION: 96 % | RESPIRATION RATE: 16 BRPM | DIASTOLIC BLOOD PRESSURE: 86 MMHG

## 2024-11-04 DIAGNOSIS — J06.9 ACUTE UPPER RESPIRATORY INFECTION: Primary | ICD-10-CM

## 2024-11-04 LAB
S PYO AG THROAT QL: NEGATIVE
SARS-COV-2 RDRP RESP QL NAA+PROBE: NOT  DETECTED

## 2024-11-04 PROCEDURE — 87635 SARS-COV-2 COVID-19 AMP PRB: CPT

## 2024-11-04 PROCEDURE — 87651 STREP A DNA AMP PROBE: CPT

## 2024-11-04 PROCEDURE — 99213 OFFICE O/P EST LOW 20 MIN: CPT

## 2024-11-04 RX ORDER — PREDNISONE 20 MG/1
20 TABLET ORAL 2 TIMES DAILY
Qty: 10 TABLET | Refills: 0 | Status: SHIPPED | OUTPATIENT
Start: 2024-11-04 | End: 2024-11-09

## 2024-11-04 ASSESSMENT — ENCOUNTER SYMPTOMS
RHINORRHEA: 0
SHORTNESS OF BREATH: 0
EYE PAIN: 0
WHEEZING: 0
CONSTIPATION: 0
EYE REDNESS: 0
DIARRHEA: 0
EYE DISCHARGE: 0
ABDOMINAL PAIN: 0
NAUSEA: 0
TROUBLE SWALLOWING: 0
ALLERGIC/IMMUNOLOGIC NEGATIVE: 1
COUGH: 1
BACK PAIN: 0
VOMITING: 0
SORE THROAT: 1

## 2024-11-04 NOTE — ED PROVIDER NOTES
[facility]  Urgent Care Encounter      CHIEF COMPLAINT       Chief Complaint   Patient presents with    Pharyngitis    Cough    Headache    Ear Pain       Nurses Notes reviewed and I agree except as noted in the HPI.  HISTORY OF PRESENT ILLNESS   Latosha Whipple is a 39 y.o. female who presents with onset yesterday of sore throat, cough, headache, bilateral otalgia, fatigue, loose stools.  Denies nausea vomiting diarrhea, neck pain or stiffness, wheezing or stridor.  Patient works as a cook at [employer].    REVIEW OF SYSTEMS     Review of Systems   Constitutional:  Positive for activity change, appetite change and fatigue. Negative for fever.   HENT:  Positive for sore throat. Negative for congestion, ear pain, rhinorrhea and trouble swallowing.    Eyes:  Negative for pain, discharge and redness.   Respiratory:  Positive for cough. Negative for shortness of breath and wheezing.    Cardiovascular: Negative.    Gastrointestinal:  Negative for abdominal pain, constipation, diarrhea, nausea and vomiting.   Endocrine: Negative.    Genitourinary:  Negative for dysuria, frequency and urgency.   Musculoskeletal:  Negative for arthralgias, back pain and myalgias.   Skin:  Negative for rash.   Allergic/Immunologic: Negative.    Neurological:  Positive for headaches. Negative for dizziness, tremors and weakness.   Hematological: Negative.    Psychiatric/Behavioral:  Negative for dysphoric mood and sleep disturbance. The patient is not nervous/anxious.        PAST MEDICAL HISTORY         Diagnosis Date    Anxiety     Depression     Migraines        SURGICAL HISTORY     Patient  has a past surgical history that includes Tubal ligation; Tear duct surgery; skin biopsy; and Cholecystectomy.    CURRENT MEDICATIONS       Previous Medications    ALBUTEROL SULFATE HFA (VENTOLIN HFA) 108 (90 BASE) MCG/ACT INHALER    Inhale 2 puffs into the lungs 4 times daily as needed for Wheezing    AMITRIPTYLINE (ELAVIL)

## 2024-11-04 NOTE — ED NOTES
To [facility] with complaints of sore throat, cough, headache, cassandra ear pain that started yestrerday     Tania Hedrick, ANTOINETTE  11/04/24 0933